# Patient Record
Sex: MALE | Race: WHITE | Employment: FULL TIME | URBAN - METROPOLITAN AREA
[De-identification: names, ages, dates, MRNs, and addresses within clinical notes are randomized per-mention and may not be internally consistent; named-entity substitution may affect disease eponyms.]

---

## 2017-01-24 ENCOUNTER — TELEPHONE (OUTPATIENT)
Dept: NEUROLOGY | Age: 57
End: 2017-01-24

## 2017-02-23 ENCOUNTER — OFFICE VISIT (OUTPATIENT)
Dept: NEUROLOGY | Age: 57
End: 2017-02-23

## 2017-02-23 DIAGNOSIS — F43.21 ADJUSTMENT REACTION, DEPRESSIVE, BRIEF: ICD-10-CM

## 2017-02-23 DIAGNOSIS — R41.3 MEMORY LOSS: Primary | ICD-10-CM

## 2017-02-23 DIAGNOSIS — Z63.0 MARITAL CONFLICT: ICD-10-CM

## 2017-02-23 SDOH — SOCIAL STABILITY - SOCIAL INSECURITY: PROBLEMS IN RELATIONSHIP WITH SPOUSE OR PARTNER: Z63.0

## 2017-03-07 ENCOUNTER — OFFICE VISIT (OUTPATIENT)
Dept: NEUROLOGY | Age: 57
End: 2017-03-07

## 2017-03-07 DIAGNOSIS — G31.84 MILD COGNITIVE IMPAIRMENT WITH MEMORY LOSS: Primary | ICD-10-CM

## 2017-03-07 DIAGNOSIS — Z63.0 MARITAL CONFLICT: ICD-10-CM

## 2017-03-07 DIAGNOSIS — F33.1 DEPRESSION, MAJOR, RECURRENT, MODERATE (HCC): ICD-10-CM

## 2017-03-07 DIAGNOSIS — F41.1 GENERALIZED ANXIETY DISORDER: ICD-10-CM

## 2017-03-07 SDOH — SOCIAL STABILITY - SOCIAL INSECURITY: PROBLEMS IN RELATIONSHIP WITH SPOUSE OR PARTNER: Z63.0

## 2017-03-08 NOTE — PROGRESS NOTES
[address] [facility],[address]  [address] [facility]   [facility] [address]    [phone] Office   [phone] Fax      Neuropsychological Evaluation Report      Referral:  Geeta Pearson MD, Dr. Geovanny Santos, [facility], Mariella Morillo, [facility]    Jean Benton is a 64 y.o. right handed   male who was unaccompanied to the initial clinical interview on 2/23/17 . Please refer to his medical records for details pertaining to his history. Briefly, the patient reported that he completed the 12th grade without history of previously diagnosed LD and/or receipt of special education services. 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 Ms. Latoya Veras recommended he come in for an evaluation as he has noticed some decline in his short term memory. Has to remind himself of things. Notices problems at work and at home, but mainly at work. Forgets the content of conversations sometimes. Misplaces. Forgets details about things. He has been diagnosed with epilepsy for which he sees Neurology. Vagus. Depends on others at work for different things. He is independent for driving, medications, finances, day-to-day chores, etc.  Spouse has noticed that he is no longer paying attention as much. Sleep is fine. Appetite is fine. Had VNS put in 8 years ago. No seizures. No other known neurologic history. Balance is okay. He started seeing Ms. Latoya Veras after he and his wife were having some marital problems. Started with Ms. Young about two and a half months now. She is wondering about possible mood concerns or underlying organic based neurocognitive concerns. He would like to have more control of the money situation and he and his spouse are having arguments about that. On his side of the family, no dementia of which he is aware. Does not know much about his real father's side of family.  His son also has a seizure disorder, no others. Son is age 34. He was first diagnosed with epilepsy as first seizure he had was about 25 years ago. No psychiatrist but was put on medication for periodic anger (Paxil). He was more aggravated with his spouse perhaps. Wondering if he needs to be on that or not. He has been on Paxil for the past 1 1/2 years, and has not noticed any difference. No previous neuropsych testing.       MMSE: 27/30    Clock: Normal    TOPF:  39    Historian: Good  Praxis: No UE apraxia  R/L Orientation: Intact to self and to other  Dress: within normal limits   Weight: within normal limits   Appearance/Hygiene: within normal limits   Gait: within normal limits   Assistive Devices: None  Mood: within normal limits   Affect: within normal limits   Comprehension: within normal limits   Thought Process: within normal limits   Expressive Language: within normal limits   Receptive Language: within normal limits   Motor:  No cognitive or motor perseveration  ETOH: Denied  Tobacco: Denied  Illicit: Denied  SI/HI: Denied  Psychosis: Denied  Insight: Within normal limits  Judgment: Within normal limits  Other Psych:      Past Medical History:   Diagnosis Date    Burning with urination     Hypertension     Seizures (HCC)     Sleep apnea     Pt has machine but does not use    Unspecified sleep apnea        Past Surgical History:   Procedure Laterality Date    HX OTHER SURGICAL  2005    vagus nerve stimulator       Allergies   Allergen Reactions    Tegretol [Carbamazepine] Rash       Family History   Problem Relation Age of Onset    Heart Disease Maternal Uncle     Cancer Father     Heart Disease Maternal Uncle        Social History   Substance Use Topics    Smoking status: Former Smoker     Packs/day: 1.50     Years: 20.00     Quit date: 1/27/2001    Smokeless tobacco: Former User    Alcohol use No       Current Outpatient Prescriptions   Medication Sig Dispense Refill    oxyCODONE-acetaminophen (PERCOCET) 5-325 mg per tablet Take 1-2 Tabs by mouth every four (4) hours as needed for Pain. Max Daily Amount: 12 Tabs. 30 Tab 0    cephALEXin (KEFLEX) 500 mg capsule Take 500 mg by mouth four (4) times daily. 1 q 6 hrs for seven days      ibuprofen (MOTRIN) 800 mg tablet Take 800 mg by mouth every eight (8) hours as needed for Pain.  metaxalone (SKELAXIN) 800 mg tablet Take 800 mg by mouth three (3) times daily. As needed      chlorthalidone (HYGROTEN) 25 mg tablet Take  by mouth daily.  tadalafil (CIALIS) 5 mg tablet Take 5 mg by mouth daily.  lacosamide (VIMPAT) 200 mg tab tablet take 1 and 1/2 tablets by mouth three times a day 405 Tab 5    levETIRAcetam (KEPPRA) 750 mg tablet Take 4 tabs tid 1080 Tab 5    PARoxetine (PAXIL) 40 mg tablet Take 40 mg by mouth daily.  rosuvastatin (CRESTOR) 40 mg tablet Take 40 mg by mouth daily. NON FORMULARY            Plan:  Obtain authorization for testing from insurance company. Report to follow once testing, scoring, and interpretation completed. ? Organic based neurocognitive issues versus mood disorder or combination of same. ? Problems organic, functional, or both? This note will not be viewable in [address] Neuropsychological Test Results  Patient Testing 3/7/17 Report Completed 3/8/17  A Psychometrist Assisted w/ portions of this evaluation while under my direct supervision    The following assessment procedures were performed:      Neuropsychologist Performed, Interpreted, & Reported: Neuropsychological Mental Status Exam, Revised Memory & Behavior Checklist, Mini Mental State Exam, Clock Drawing Test, Test Of Premorbid Functioning, Rubin-Melzack Pain Questionnaire,  History Taking  & Clinical Interview With The Patient,  Review Of Available Records.     Psychometrist Administered & Neuropsychologist Interpreted & Neuropsychologist Reported: Finger Tapping Test, Grooved Pegboard Test, Speech-Sounds Perception Test, JUDE, Wechsler Adult Intelligence Scale - IV, Verbal Fluency Tests, Wade & Wade - Revised, Trailmaking Test Parts A & B, [place] Verbal Learning Test - II, Naldo Complex Figure Test, Blake Depression Inventory - II, Blake Anxiety Inventory, Personality Assessment Inventory. Computer Administered & Neuropsychologist Interpreted & Neuropsychologist Reported: Yarelis Continuous Performance Test - III      Test Findings:  Test Findings:  Note:  The patients raw data have been compared with currently available norms which include demographic corrections for age, gender, and/or education. Sometimes, the patients scores are compared to demographically similar individuals as close to the patients age, education level, etc., as possible. \"Average\" is viewed as being +/- 1 standard deviation (SD) from the stated mean for a particular test score. \"Low average\" is viewed as being between 1 and 2 SD below the mean, and above average is viewed as being 1 and 2 SD above the mean. Scores falling in the borderline range (between 1-1/2 and 2 SD below the mean) are viewed with particular attention as to whether they are normal or abnormal neurocognitive test scores. Other methods of inference in analyzing the test data are also utilized, including the pattern and range of scores in the profile, bilateral motor functions, and the presence, if any, of pathognomonic signs. Behaviorally, the patient was friendly and cooperative and appeared motivated to perform well during this examination. Within this context, the results of this evaluation are viewed as a valid reflection of the patients actual neurocognitive and emotional status. His structured word list fluency, as assessed by the FAS Test, was within the moderately impaired range with a T score of 29. Category fluency was within the mildly to moderately impaired range with a T score of 34.   Confrontation naming ability, as assessed by the Wade & Wade - Revised, was within the mildly impaired range at 47/60 correct (T = 37). This pattern of performance is indicative of a patient who is at increased risk for day-to-day problems with verbal fluency and confrontation naming. The patient was administered the Yarelis Continuous Performance Test - III, a computer-administered test of sustained attention, and review of the subscales within this instrument revealed mild concerns for inattentiveness without impulsivity. Verbal auditory attention and discrimination, as assessed by the Speech-Sounds Perception Test (T = 38) was within the mildly impaired range. Nonverbal auditory attention and discrimination, as assessed by the JUDE, also revealed very mild concern for inattentiveness without impulsivity. This pattern of performance is indicative of a patient who is at increased risk for day-to-day problems with sustained visual attention/concentration, verbal auditory attention, and nonverbal auditory attention and discrimination related abilities. The patient was administered the Wechsler Adult Intelligence Scale - IV. There was a clinically significant difference between his low average range Working Memory Index score of 80 (9th %iile) and his average range Processing Speed Index score of 97. This pattern of performance is not indicative of a patient who is at increased risk for day-to-day problems with working memory or processing speed. His Verbal Comprehension Index score of 74 was borderline, as was his Perceptual Reasoning Index score of 77. See Appendix I (scanned into media section of this EMR) for full breakdown of IQ test scores. Perceptual reasoning and verbal comprehension are both lower than expected based on his performance on a measure assessing premorbid functioning levels. The patient was administered the New Early Verbal Learning Test  - II and generated a normal range (and positive) learning curve over five repeated auditory word list learning trials.   An interference trial was impaired. Free and cued short delayed recall were just within the normal range. Free and cued, long delayed recall were both impaired. Recognition recall was impaired. Forced choice recall was borderline. This pattern of performance is indicative of a patient who is at increased risk for day-to-day problems with auditory delayed memory. The patients performance on the copy portion of the Naldo Complex Figure Test was impaired. Recall for the complex design was within the impaired range after both short and long delays. Recognition recall was low average. This pattern of performance is  indicative of a patient who is at increased risk for day-to-day problems with visual organization and visual delayed memory. Simple timed visual motor sequencing (Trailmaking Test Part A) was within the average range with a T score of 54. His performance on a similar, but more complex task of timed visual motor sequencing (Trailmaking Test Part B) was within the mildly impaired range with a T score of 39. He made zero sequencing errors on this latter test.  Taken together, this pattern of performance is not indicative of a patient who is at increased risk for day-to-day problems with executive functioning. Motor speed for finger tapping was within the normal range bilaterally. Fine motor dexterity was below average for his dominant hand and mildly impaired for his nondominant hand. This is a mixed pattern of performance with respect to potentially lateralized issues for which neurologic correlation is indicated. The patient rated his current level of pain as \"1/5 - Mild\" on the Rubin-Melzack Pain Questionnaire. He reported pain in his back and left shoulder region. His Blake Depression Inventory -II score of 21 was within the moderately depressed range. His Blake Anxiety Inventory score of 19 reflected moderate anxiety.   The patient was administered the Personality Assessment Inventory and a high level of defensiveness was present, as was some exaggeration of some symptoms. This is somewhat unusual.  The personality profile suggests a person who has depression and a strong need to be accepted by others. His level of treatment motivation is somewhat low, and I do not see evidence of additional Axis I or any Axis II psychopathology. Impressions & Recommendations: This patient generated an abnormal range Neuropsychological Evaluation with respect to neurocognitive functioning. In this regard, significant impairments are noted for auditory memory and visual memory. Moderate impairments are noted for verbal fluency and confrontation naming. Mild impairments are noted for visual and auditory attention related abilities. His IQ and executive functioning abilities remain normal.  Emotionally, he reported moderate depression and anxiety. There is evidence of a decline from estimated premorbid functioning levels. While young for this type of memory loss, I am concerned both about his currently impaired range memory scores as well as the report/concern that memory issues are becoming more progressive. Should a reversible cause for this memory loss not be ruled out, then this is atypical onset of dementia. Neither the attention problem (mild), the mood problems (moderate) or the combination of those issues would explain completely this sort of memory loss, especially given that his auditory recognition recall is normal.  At this point, consider medication for memory, if only on a prophylactic basis. Furthermore, I recommend psychiatric treatment and continued counseling for depression and anxiety. The attention problem is very mild, and may be worth treatment at some point, but right now I am concerned more about memory and mood. I find him competent, but he needs supervision for medications and finances.   I am not overly concerned about driving but he should use a GPS device if travelling to less frequented places. I would like to follow up with him in six months to test his memory again to track and see whether or not memory is declining to give a more definitive diagnosis and to allow for his ongoing neurologic work ups to be completed. Baseline now established. Follow up prn. Clinical correlation is, of course, indicated. I will discuss these findings with the patient when he follows up with me in the near future. A follow up Neuropsychological Evaluation is indicated on a prn basis, especially if there are any cognitive and/or emotional changes. DIAGNOSES:  MCI W/ Memory Loss     Major Depression - Moderate     Anxiety Disorder - Moderate     Relationship problem/stress     The above information is based upon information currently available to me. If there is any additional information of which I am currently unaware, I would be more than happy to review it upon having it made available to me. Thank you for the opportunity to see this interesting individual.     Sincerely,       Pam Gandara. Bita Stout, EdS        dd  CC:  Loreta Abreu MD, Dr. Esau Collins, MsDilcia Hilario, Ms. Bj Silver        2 units -[id number]  Record review. Review of history provided by patient. Review of collaborative information. Testing by Clinician. Review of raw data. Scoring. Report writing of individual tests administered by Clinician. Integration of individual tests administered by psychometrist (that were previously reported and billed under psychometry code below) with testing by clinician and review of records/history/collaborative information. Case Conceptualization, Report writing. Coordination Of Care. 5 units  -[id number] - Psychometrist test prep, administration, and scoring under clinician's direct supervision.   Clinical interpretation of individual tests administered by psychometrist .  Clinician report of individual tests administered by psychometrist.    1 unit - 14128 - Computer testing and scoring and clinician's interpretation of computer-administered test(s)    \"Unit\" is defined by CPT/National Guidelines (31 - 60 minutes). Integral services including scoring of raw data, data interpretation, case conceptualization, report writing etcetera were initiated after the patient finished testing/raw data collected and was completed on the date the report was signed.

## 2017-03-09 DIAGNOSIS — G40.219 PARTIAL EPILEPSY WITH IMPAIRMENT OF CONSCIOUSNESS, INTRACTABLE (HCC): ICD-10-CM

## 2017-03-09 RX ORDER — LEVETIRACETAM 750 MG/1
TABLET ORAL
Qty: 1080 TAB | Refills: 1 | Status: SHIPPED | OUTPATIENT
Start: 2017-03-09 | End: 2017-08-10 | Stop reason: SDUPTHER

## 2017-03-22 DIAGNOSIS — G40.219 PARTIAL EPILEPSY WITH IMPAIRMENT OF CONSCIOUSNESS, INTRACTABLE (HCC): ICD-10-CM

## 2017-03-22 RX ORDER — LACOSAMIDE 200 MG/1
TABLET ORAL
Qty: 405 TAB | Refills: 5 | Status: SHIPPED | OUTPATIENT
Start: 2017-03-22 | End: 2017-11-10 | Stop reason: SDUPTHER

## 2017-04-20 ENCOUNTER — OFFICE VISIT (OUTPATIENT)
Dept: NEUROLOGY | Age: 57
End: 2017-04-20

## 2017-04-20 VITALS
BODY MASS INDEX: 30.88 KG/M2 | WEIGHT: 233 LBS | SYSTOLIC BLOOD PRESSURE: 140 MMHG | DIASTOLIC BLOOD PRESSURE: 70 MMHG | RESPIRATION RATE: 20 BRPM | HEIGHT: 73 IN

## 2017-04-20 DIAGNOSIS — G31.84 MCI (MILD COGNITIVE IMPAIRMENT): Primary | ICD-10-CM

## 2017-04-20 RX ORDER — DONEPEZIL HYDROCHLORIDE 10 MG/1
10 TABLET, FILM COATED ORAL
Qty: 30 TAB | Refills: 5 | Status: SHIPPED | OUTPATIENT
Start: 2017-04-20 | End: 2017-11-10 | Stop reason: SDUPTHER

## 2017-04-20 RX ORDER — DONEPEZIL HYDROCHLORIDE 5 MG/1
TABLET, FILM COATED ORAL
Qty: 30 TAB | Refills: 0 | Status: SHIPPED | OUTPATIENT
Start: 2017-04-20 | End: 2017-08-22

## 2017-04-20 NOTE — PATIENT INSTRUCTIONS
[facility]   Statement to Patients  April 1, 2014      In an effort to ensure the large volume of patient prescription refills is processed in the most efficient and expeditious manner, we are asking our patients to assist us by calling your Pharmacy for all prescription refills, this will include also your  Mail Order Pharmacy. The pharmacy will contact our office electronically to continue the refill process. Please do not wait until the last minute to call your pharmacy. We need at least 48 hours (2days) to fill prescriptions. We also encourage you to call your pharmacy before going to  your prescription to make sure it is ready. With regard to controlled substance prescription refill requests (narcotic refills) that need to be picked up at our office, we ask your cooperation by providing us with at least 72 hours (3days) notice that you will need a refill. We will not refill narcotic prescription refill requests after 4:00pm on any weekday, Monday through Thursday, or after 2:00pm on Fridays, or on the weekends. We encourage everyone to explore another way of getting your prescription refill request processed using [employer], our patient web portal through our electronic medical record system. [employer] is an efficient and effective way to communicate your medication request directly to the office and  downloadable as an natacha on your smart phone . [employer] also features a review functionality that allows you to view your medication list as well as leave messages for your physician. Are you ready to get connected? If so please review the attatched instructions or speak to any of our staff to get you set up right away! Thank you so much for your cooperation. Should you have any questions please contact our Practice Administrator. The Physicians and Staff,  [facility] [address] [place] [address]  What is a living will?   A living will is a legal form you use to write down the kind of care you want at the end of your life. It is used by the health professionals who will treat you if you aren't able to decide for yourself. If you put your wishes in writing, your loved ones and others will know what kind of care you want. They won't need to guess. This can ease your mind and be helpful to others. A living will is not the same as an estate or property will. An estate will explains what you want to happen with your money and property after you die. Is a living will a legal document? A living will is a legal document. Each state has its own laws about living fermin. If you move to another state, make sure that your living will is legal in the state where you now live. Or you might use a universal form that has been approved by many states. This kind of form can sometimes be completed and stored online. Your electronic copy will then be available wherever you have a connection to the Internet. In most cases, doctors will respect your wishes even if you have a form from a different state. · You don't need an  to complete a living will. But legal advice can be helpful if your state's laws are unclear, your health history is complicated, or your family can't agree on what should be in your living will. · You can change your living will at any time. Some people find that their wishes about end-of-life care change as their health changes. · In addition to making a living will, think about completing a medical power of  form. This form lets you name the person you want to make end-of-life treatment decisions for you (your \"health care agent\") if you're not able to. Many hospitals and nursing homes will give you the forms you need to complete a living will and a medical power of . · Your living will is used only if you can't make or communicate decisions for yourself anymore.  If you become able to make decisions again, you can accept or refuse any treatment, no matter what you wrote in your living will. · Your state may offer an online registry. This is a place where you can store your living will online so the doctors and nurses who need to treat you can find it right away. What should you think about when creating a living will? Talk about your end-of-life wishes with your family members and your doctor. Let them know what you want. That way the people making decisions for you won't be surprised by your choices. Think about these questions as you make your living will:  · Do you know enough about life support methods that might be used? If not, talk to your doctor so you know what might be done if you can't breathe on your own, your heart stops, or you're unable to swallow. · What things would you still want to be able to do after you receive life-support methods? Would you want to be able to walk? To speak? To eat on your own? To live without the help of machines? · If you have a choice, where do you want to be cared for? In your home? At a hospital or nursing home? · Do you want certain Shinto practices performed if you become very ill? · If you have a choice at the end of your life, where would you prefer to die? At home? In a hospital or nursing home? Somewhere else? · Would you prefer to be buried or cremated? · Do you want your organs to be donated after you die? What should you do with your living will? · Make sure that your family members and your health care agent have copies of your living will. · Give your doctor a copy of your living will to keep in your medical record. If you have more than one doctor, make sure that each one has a copy. · You may want to put a copy of your living will where it can be easily found. Where can you learn more? Go to http://blossom-emma.info/. Enter L013 in the search box to learn more about \"Learning About Living Chantal. \"  Current as of: February 24, 2016  Content Version: 11.2  © 7590-3834 [employer]. Care instructions adapted under license by [employer] (which disclaims liability or warranty for this information). If you have questions about a medical condition or this instruction, always ask your healthcare professional. [facility] [address] any warranty or liability for your use of this information. Advance Directives: Care Instructions  Your Care Instructions  An advance directive is a legal way to state your wishes at the end of your life. It tells your family and your doctor what to do if you can no longer say what you want. There are two main types of advance directives. You can change them any time that your wishes change. · A living will tells your family and your doctor your wishes about life support and other treatment. · A durable power of  for health care lets you name a person to make treatment decisions for you when you can't speak for yourself. This person is called a health care agent. If you do not have an advance directive, decisions about your medical care may be made by a doctor or a  who doesn't know you. It may help to think of an advance directive as a gift to the people who care for you. If you have one, they won't have to make tough decisions by themselves. Follow-up care is a key part of your treatment and safety. Be sure to make and go to all appointments, and call your doctor if you are having problems. It's also a good idea to know your test results and keep a list of the medicines you take. How can you care for yourself at home? · Discuss your wishes with your loved ones and your doctor. This way, there are no surprises. · Many states have a unique form. Or you might use a universal form that has been approved by many states. This kind of form can sometimes be completed and stored online.  Your electronic copy will then be available wherever you have a connection to the Internet. In most cases, doctors will respect your wishes even if you have a form from a different state. · You don't need a  to do an advance directive. But you may want to get legal advice. · Think about these questions when you prepare an advance directive:  ¨ Who do you want to make decisions about your medical care if you are not able to? Many people choose a family member or close friend. ¨ Do you know enough about life support methods that might be used? If not, talk to your doctor so you understand. ¨ What are you most afraid of that might happen? You might be afraid of having pain, losing your independence, or being kept alive by machines. ¨ Where would you prefer to die? Choices include your home, a hospital, or a nursing home. ¨ Would you like to have information about hospice care to support you and your family? ¨ Do you want to donate organs when you die? ¨ Do you want certain Sikh practices performed before you die? If so, put your wishes in the advance directive. · Read your advance directive every year, and make changes as needed. When should you call for help? Be sure to contact your doctor if you have any questions. Where can you learn more? Go to http://blossom-emma.info/. Enter R264 in the search box to learn more about \"Advance Directives: Care Instructions. \"  Current as of: November 17, 2016  Content Version: 11.2  © 8822-2869 [employer]. Care instructions adapted under license by [employer] (which disclaims liability or warranty for this information). If you have questions about a medical condition or this instruction, always ask your healthcare professional. [address] any warranty or liability for your use of this information.

## 2017-04-20 NOTE — PROGRESS NOTES
Amanda Chaves is a 64 y.o. male who presents with the following  Chief Complaint   Patient presents with    Memory Loss       HPI Patient of Dr. Katty Allen coming in for a follow up for memory testing. States his memory is about the same. Feels like things have been stable just having trouble with day to day remembering things he should remember. 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 Going to counseling with wife and feels like this is helping him. Seizures stable. Allergies   Allergen Reactions    Tegretol [Carbamazepine] Rash       Current Outpatient Prescriptions   Medication Sig    donepezil (ARICEPT) 5 mg tablet Take 1 tablet by mouth nightly X 1 month. Then increase to 10 mg thereafter.  donepezil (ARICEPT) 10 mg tablet Take 1 Tab by mouth nightly.  lacosamide (VIMPAT) 200 mg tab tablet take 1 and 1/2 tablets by mouth three times a day    levETIRAcetam (KEPPRA) 750 mg tablet Take 4 tabs tid    ibuprofen (MOTRIN) 800 mg tablet Take 800 mg by mouth every eight (8) hours as needed for Pain.  tadalafil (CIALIS) 5 mg tablet Take 5 mg by mouth daily.  chlorthalidone (HYGROTEN) 25 mg tablet Take  by mouth daily.  PARoxetine (PAXIL) 40 mg tablet Take 40 mg by mouth daily. No current facility-administered medications for this visit.         History   Smoking Status    Former Smoker    Packs/day: 1.50    Years: 20.00    Quit date: 1/27/2001   Smokeless Tobacco    Former User       Past Medical History:   Diagnosis Date    Burning with urination     Hypertension     Seizures ([address]     Sleep apnea     Pt has machine but does not use    Unspecified sleep apnea        Past Surgical History:   Procedure Laterality Date    HX OTHER SURGICAL  2005    vagus nerve stimulator       Family History   Problem Relation Age of Onset    Heart Disease Maternal Uncle     Cancer Father     Heart Disease Maternal Uncle        Social History     Social History    Marital status:      Spouse name: N/A    Number of children: N/A    Years of education: N/A     Social History Main Topics    Smoking status: Former Smoker     Packs/day: 1.50     Years: 20.00     Quit date: 1/27/2001    Smokeless tobacco: Former User    Alcohol use No    Drug use: No    Sexual activity: Not Asked     Other Topics Concern    None     Social History Narrative       Review of Systems   HENT: Negative for hearing loss and tinnitus. Eyes: Negative for blurred vision, double vision and photophobia. Gastrointestinal: Negative for nausea and vomiting. Neurological: Negative for weakness and headaches. Psychiatric/Behavioral: Positive for depression and memory loss. Negative for hallucinations and suicidal ideas. The patient is nervous/anxious. The patient does not have insomnia. Remainder of comprehensive review is negative. Physical Exam :    Visit Vitals    /70    Resp 20    Ht 6' 1\" (1.854 m)    Wt 105.7 kg (233 lb)    BMI 30.74 kg/m2               Results for orders placed or performed during the hospital encounter of 07/01/16   POC CHEM8   Result Value Ref Range    Calcium, ionized (POC) 1.12 1.12 - 1.32 MMOL/L    Sodium (POC) 143 136 - 145 MMOL/L    Potassium (POC) 3.7 3.5 - 5.1 MMOL/L    Chloride (POC) 99 98 - 107 MMOL/L    CO2 (POC) 29 21 - 32 MMOL/L    Anion gap (POC) 20 (H) 5 - 15 mmol/L    Glucose (POC) 99 75 - 110 MG/DL    BUN (POC) 14 9 - 20 MG/DL    Creatinine (POC) 0.9 0.6 - 1.3 MG/DL    GFR-AA (POC) >60 >60 ml/min/1.73m2    GFR, non-AA (POC) >60 >60 ml/min/1.73m2    Hemoglobin (POC) 14.6 12.1 - 17.0 GM/DL    Hematocrit (POC) 43 36.6 - 50.3 %    Comment Comment Not Indicated. Orders Placed This Encounter    donepezil (ARICEPT) 5 mg tablet     Sig: Take 1 tablet by mouth nightly X 1 month. Then increase to 10 mg thereafter. Dispense:  30 Tab     Refill:  0    donepezil (ARICEPT) 10 mg tablet     Sig: Take 1 Tab by mouth nightly.      Dispense:  30 Tab     Refill:  5       1. MCI (mild cognitive impairment)        Follow-up Disposition:  Return in about 2 months (around 6/20/2017). We discussed his neuropsychiatric testing in full. He has no questions about the results. He will continue therapy for his psychiatric issues along with his marriage counseling. He will try Aricept up to 10 mg nightly to help with memory loss and prevention of further loss. We discussed MCI and dementia and progression of both. We discussed re testing in 6 months and he understands and agrees. Stay mentally and physically active. Call with any issues.        Renata Horvath NP        This note will not be viewable in [address]

## 2017-04-20 NOTE — MR AVS SNAPSHOT
Visit Information Date & Time Provider Department Dept. Phone Encounter #  
 4/20/2017  2:30 PM Jigar Crowder NP [facility] [phone] [id number] Follow-up Instructions Return in about 2 months (around 6/20/2017). Your Appointments 6/27/2017  8:20 AM  
Follow Up with Governor Gladis MD  
[facility] Appt Note: seizure kathy $0CP  
 [address] [address]  
[place] [address] [address] Upcoming Health Maintenance Date Due Hepatitis C Screening 1960 DTaP/Tdap/Td series (1 - Tdap) 9/20/1981 FOBT Q 1 YEAR AGE 50-75 9/20/2010 INFLUENZA AGE 9 TO ADULT 8/1/2016 Allergies as of 4/20/2017  Review Complete On: 3/8/2017 By: Marjorie Adorno PsyD Severity Noted Reaction Type Reactions Tegretol [Carbamazepine]  01/12/2011    Rash Current Immunizations  Never Reviewed No immunizations on file. Not reviewed this visit You Were Diagnosed With   
  
 Codes Comments MCI (mild cognitive impairment)    -  Primary ICD-10-CM: G31.84 ICD-9-CM: 331.83 Vitals BP Resp Height(growth percentile) Weight(growth percentile) BMI Smoking Status 140/70 20 6' 1\" (1.854 m) 233 lb (105.7 kg) 30.74 kg/m2 Former Smoker BMI and BSA Data Body Mass Index Body Surface Area 30.74 kg/m 2 2.33 m 2 Preferred Pharmacy Pharmacy Name Phone [facility] [address] Friday [phone] Your Updated Medication List  
  
   
This list is accurate as of: 4/20/17  3:05 PM.  Always use your most recent med list.  
  
  
  
  
 chlorthalidone 25 mg tablet Commonly known as:  Micaela Kallman Take  by mouth daily. * donepezil 5 mg tablet Commonly known as:  ARICEPT Take 1 tablet by mouth nightly X 1 month.  Then increase to 10 mg thereafter. * donepezil 10 mg tablet Commonly known as:  ARICEPT Take 1 Tab by mouth nightly. ibuprofen 800 mg tablet Commonly known as:  MOTRIN Take 800 mg by mouth every eight (8) hours as needed for Pain. lacosamide 200 mg Tab tablet Commonly known as:  VIMPAT  
take 1 and 1/2 tablets by mouth three times a day  
  
 levETIRAcetam 750 mg tablet Commonly known as:  KEPPRA Take 4 tabs tid PAXIL 40 mg tablet Generic drug:  PARoxetine Take 40 mg by mouth daily. tadalafil 5 mg tablet Commonly known as:  CIALIS Take 5 mg by mouth daily. * Notice: This list has 2 medication(s) that are the same as other medications prescribed for you. Read the directions carefully, and ask your doctor or other care provider to review them with you. Prescriptions Sent to Pharmacy Refills  
 donepezil (ARICEPT) 5 mg tablet 0 Sig: Take 1 tablet by mouth nightly X 1 month. Then increase to 10 mg thereafter. Class: Normal  
 Pharmacy: [place] [address] Ph #: [phone]  
 donepezil (ARICEPT) 10 mg tablet 5 Sig: Take 1 Tab by mouth nightly. Class: Normal  
 Pharmacy: [address] BR[place]ANY DODGE [facility] Ph #: [phone] Route: Oral  
  
Follow-up Instructions Return in about 2 months (around 6/20/2017). Patient Instructions PRESCRIPTION REFILL POLICY [facility] Statement to Patients April 1, 2014 In an effort to ensure the large volume of patient prescription refills is processed in the most efficient and expeditious manner, we are asking our patients to assist us by calling your Pharmacy for all prescription refills, this will include also your  Mail Order Pharmacy. The pharmacy will contact our office electronically to continue the refill process. Please do not wait until the last minute to call your pharmacy. We need at least 48 hours (2days) to fill prescriptions. We also encourage you to call your pharmacy before going to  your prescription to make sure it is ready. With regard to controlled substance prescription refill requests (narcotic refills) that need to be picked up at our office, we ask your cooperation by providing us with at least 72 hours (3days) notice that you will need a refill. We will not refill narcotic prescription refill requests after 4:00pm on any weekday, Monday through Thursday, or after 2:00pm on Fridays, or on the weekends. We encourage everyone to explore another way of getting your prescription refill request processed using Enigmatec, our patient web portal through our electronic medical record system. Enigmatec is an efficient and effective way to communicate your medication request directly to the office and  downloadable as an natacha on your smart phone . Enigmatec also features a review functionality that allows you to view your medication list as well as leave messages for your physician. Are you ready to get connected? If so please review the attatched instructions or speak to any of our staff to get you set up right away! Thank you so much for your cooperation. Should you have any questions please contact our Practice Administrator. The Physicians and Staff,  [facility] [place] [address] What is a living will? A living will is a legal form you use to write down the kind of care you want at the end of your life. It is used by the health professionals who will treat you if you aren't able to decide for yourself. If you put your wishes in writing, your loved ones and others will know what kind of care you want. They won't need to guess. This can ease your mind and be helpful to others. A living will is not the same as an estate or property will.  An estate will explains what you want to happen with your money and property after you die. Is a living will a legal document? A living will is a legal document. Each state has its own laws about living fermin. If you move to another state, make sure that your living will is legal in the state where you now live. Or you might use a universal form that has been approved by many states. This kind of form can sometimes be completed and stored online. Your electronic copy will then be available wherever you have a connection to the Internet. In most cases, doctors will respect your wishes even if you have a form from a different state. · You don't need an  to complete a living will. But legal advice can be helpful if your state's laws are unclear, your health history is complicated, or your family can't agree on what should be in your living will. · You can change your living will at any time. Some people find that their wishes about end-of-life care change as their health changes. · In addition to making a living will, think about completing a medical power of  form. This form lets you name the person you want to make end-of-life treatment decisions for you (your \"health care agent\") if you're not able to. Many hospitals and nursing homes will give you the forms you need to complete a living will and a medical power of . · Your living will is used only if you can't make or communicate decisions for yourself anymore. If you become able to make decisions again, you can accept or refuse any treatment, no matter what you wrote in your living will. · Your state may offer an online registry. This is a place where you can store your living will online so the doctors and nurses who need to treat you can find it right away. What should you think about when creating a living will?  
Talk about your end-of-life wishes with your family members and your doctor. Let them know what you want. That way the people making decisions for you won't be surprised by your choices. Think about these questions as you make your living will: · Do you know enough about life support methods that might be used? If not, talk to your doctor so you know what might be done if you can't breathe on your own, your heart stops, or you're unable to swallow. · What things would you still want to be able to do after you receive life-support methods? Would you want to be able to walk? To speak? To eat on your own? To live without the help of machines? · If you have a choice, where do you want to be cared for? In your home? At a hospital or nursing home? · Do you want certain Shinto practices performed if you become very ill? · If you have a choice at the end of your life, where would you prefer to die? At home? In a hospital or nursing home? Somewhere else? · Would you prefer to be buried or cremated? · Do you want your organs to be donated after you die? What should you do with your living will? · Make sure that your family members and your health care agent have copies of your living will. · Give your doctor a copy of your living will to keep in your medical record. If you have more than one doctor, make sure that each one has a copy. · You may want to put a copy of your living will where it can be easily found. Where can you learn more? Go to http://blossom-emma.info/. Enter D592 in the search box to learn more about \"Learning About Living Chantal. \" Current as of: February 24, 2016 Content Version: 11.2 © 9096-4008 Dnevnik. Care instructions adapted under license by AzureBooker (which disclaims liability or warranty for this information).  If you have questions about a medical condition or this instruction, always ask your healthcare professional. [address]  any warranty or liability for your use of this information. Advance Directives: Care Instructions Your Care Instructions An advance directive is a legal way to state your wishes at the end of your life. It tells your family and your doctor what to do if you can no longer say what you want. There are two main types of advance directives. You can change them any time that your wishes change. · A living will tells your family and your doctor your wishes about life support and other treatment. · A durable power of  for health care lets you name a person to make treatment decisions for you when you can't speak for yourself. This person is called a health care agent. If you do not have an advance directive, decisions about your medical care may be made by a doctor or a  who doesn't know you. It may help to think of an advance directive as a gift to the people who care for you. If you have one, they won't have to make tough decisions by themselves. Follow-up care is a key part of your treatment and safety. Be sure to make and go to all appointments, and call your doctor if you are having problems. It's also a good idea to know your test results and keep a list of the medicines you take. How can you care for yourself at home? · Discuss your wishes with your loved ones and your doctor. This way, there are no surprises. · Many states have a unique form. Or you might use a universal form that has been approved by many states. This kind of form can sometimes be completed and stored online. Your electronic copy will then be available wherever you have a connection to the Internet. In most cases, doctors will respect your wishes even if you have a form from a different state. · You don't need a  to do an advance directive. But you may want to get legal advice. · Think about these questions when you prepare an advance directive: ¨ Who do you want to make decisions about your medical care if you are not able to? Many people choose a family member or close friend. ¨ Do you know enough about life support methods that might be used? If not, talk to your doctor so you understand. ¨ What are you most afraid of that might happen? You might be afraid of having pain, losing your independence, or being kept alive by machines. ¨ Where would you prefer to die? Choices include your home, a hospital, or a nursing home. ¨ Would you like to have information about hospice care to support you and your family? ¨ Do you want to donate organs when you die? ¨ Do you want certain Bahai practices performed before you die? If so, put your wishes in the advance directive. · Read your advance directive every year, and make changes as needed. When should you call for help? Be sure to contact your doctor if you have any questions. Where can you learn more? Go to http://blossom-emma.info/. Enter R264 in the search box to learn more about \"Advance Directives: Care Instructions. \" Current as of: November 17, 2016 Content Version: 11.2 © 7357-2335 ZPower. Care instructions adapted under license by iValidate.me (which disclaims liability or warranty for this information). If you have questions about a medical condition or this instruction, always ask your healthcare professional. [address] any warranty or liability for your use of this information. Introducing [facility] SERVICES! Dear Suni Ojeda: Thank you for requesting a Internet college internation S.L. account. Our records indicate that you already have an active Internet college internation S.L. account. You can access your account anytime at [email or web address] Did you know that you can access your hospital and ER discharge instructions at any time in Internet college internation S.L.? You can also review all of your test results from your hospital stay or ER visit. Additional Information If you have questions, please visit the Frequently Asked Questions section of the Kwikpikt website at [email or web address] Remember, OptaHEALTH is NOT to be used for urgent needs. For medical emergencies, dial 911. Now available from your iPhone and Android! Please provide this summary of care documentation to your next provider. Your primary care clinician is listed as [facility]. If you have any questions after today's visit, please call [phone].

## 2017-08-10 ENCOUNTER — TELEPHONE (OUTPATIENT)
Dept: NEUROLOGY | Age: 57
End: 2017-08-10

## 2017-08-10 DIAGNOSIS — G40.219 PARTIAL EPILEPSY WITH IMPAIRMENT OF CONSCIOUSNESS, INTRACTABLE (HCC): ICD-10-CM

## 2017-08-10 RX ORDER — LEVETIRACETAM 750 MG/1
TABLET ORAL
Qty: 1080 TAB | Refills: 1 | Status: SHIPPED | OUTPATIENT
Start: 2017-08-10 | End: 2018-01-29 | Stop reason: SDUPTHER

## 2017-08-10 NOTE — TELEPHONE ENCOUNTER
----- Message from Antonio Baez sent at 8/10/2017  9:42 AM EDT -----  Regarding: /[place]  Pt requesting a \" Vimpat\" Rx sent to mail order on file. Please give a call. Best contact [phone].

## 2017-08-22 ENCOUNTER — OFFICE VISIT (OUTPATIENT)
Dept: NEUROLOGY | Age: 57
End: 2017-08-22

## 2017-08-22 VITALS
SYSTOLIC BLOOD PRESSURE: 120 MMHG | HEART RATE: 80 BPM | OXYGEN SATURATION: 99 % | WEIGHT: 190 LBS | DIASTOLIC BLOOD PRESSURE: 88 MMHG | BODY MASS INDEX: 25.18 KG/M2 | TEMPERATURE: 98.2 F | RESPIRATION RATE: 18 BRPM | HEIGHT: 73 IN

## 2017-08-22 DIAGNOSIS — G31.84 MCI (MILD COGNITIVE IMPAIRMENT): ICD-10-CM

## 2017-08-22 DIAGNOSIS — G40.219 PARTIAL EPILEPSY WITH IMPAIRMENT OF CONSCIOUSNESS, INTRACTABLE (HCC): Primary | ICD-10-CM

## 2017-08-22 NOTE — PROGRESS NOTES
Follow up for seizure activity. Reports no seizure activity since last visit. No acute problems reported.

## 2017-08-22 NOTE — MR AVS SNAPSHOT
Visit Information Date & Time Provider Department Dept. Phone Encounter #  
 8/22/2017 11:40 AM Vannessa Patiño MD [address] [place] [address] Neurology Clinic [phone] [id number] Follow-up Instructions Return in about 6 months (around 2/22/2018). Upcoming Health Maintenance Date Due Hepatitis C Screening 1960 DTaP/Tdap/Td series (1 - Tdap) 9/20/1981 FOBT Q 1 YEAR AGE 50-75 9/20/2010 INFLUENZA AGE 9 TO ADULT 8/1/2017 Allergies as of 8/22/2017  Review Complete On: 8/22/2017 By: Jeff Kent LPN Severity Noted Reaction Type Reactions Tegretol [Carbamazepine]  01/12/2011    Rash Current Immunizations  Never Reviewed No immunizations on file. Not reviewed this visit Vitals BP Pulse Temp Resp Height(growth percentile) Weight(growth percentile) 120/88 80 98.2 °F (36.8 °C) 18 6' 1\" (1.854 m) 190 lb (86.2 kg) SpO2 BMI Smoking Status 99% 25.07 kg/m2 Former Smoker Vitals History BMI and BSA Data Body Mass Index Body Surface Area 25.07 kg/m 2 2.11 m 2 Preferred Pharmacy Pharmacy Name Phone [facility], [address] [place] [address] [address] [place] [address] Your Updated Medication List  
  
   
This list is accurate as of: 8/22/17 11:56 AM.  Always use your most recent med list.  
  
  
  
  
 chlorthalidone 25 mg tablet Commonly known as:  Catherine Sous Take  by mouth daily. donepezil 10 mg tablet Commonly known as:  ARICEPT Take 1 Tab by mouth nightly. ibuprofen 800 mg tablet Commonly known as:  MOTRIN Take 800 mg by mouth every eight (8) hours as needed for Pain. lacosamide 200 mg Tab tablet Commonly known as:  VIMPAT  
take 1 and 1/2 tablets by mouth three times a day  
  
 levETIRAcetam 750 mg tablet Commonly known as:  KEPPRA Take 4 tabs tid PAXIL 40 mg tablet Generic drug:  PARoxetine Take 40 mg by mouth daily. tadalafil 5 mg tablet Commonly known as:  CIALIS Take 5 mg by mouth daily. Follow-up Instructions Return in about 6 months (around 2/22/2018). Patient Instructions Information Regarding Testing If you have physican order for a test or a medication denied by your insurance company, this does not mean the test or medication is not appropriate for you as that is a medical decision, not a decision to be made by an insurance company representative or by an [facility] physician who has not interviewed and examined you. This is a decision to be made between you and your physician. The denial of services is a contractual matter between you and your insurance company, not an issue between your physician and the insurance company. If your test or medication is denied, you can take the following steps to help resolve the issue: 1. File a complaint with the [facility] of Insurance regarding your insurance company's denial of services ordered for you. You can do this either by calling them directly or by completing an on-line complaint form on the Magnitude Software. This can be found at [email or web address] 2. Also file a formal complaint with your insurance company and ask to have the name of the person denying the service so that you may explore a legal option should you be harmed by this denial of service. Again, the fact the insurance company will not pay for the service does not mean it is not medically necessary and I would encourage you to follow through with the plan that was made with your physician 3. File a written complaint with your employer so your employer and benefit manager is aware of the poor coverage they are providing their employees. If you have medicare/medicaid, complain to your representative in the House and to your Judith Dick. PRESCRIPTION REFILL POLICY [facility] Statement to Patients April 1, 2014 In an effort to ensure the large volume of patient prescription refills is processed in the most efficient and expeditious manner, we are asking our patients to assist us by calling your Pharmacy for all prescription refills, this will include also your  Mail Order Pharmacy. The pharmacy will contact our office electronically to continue the refill process. Please do not wait until the last minute to call your pharmacy. We need at least 48 hours (2days) to fill prescriptions. We also encourage you to call your pharmacy before going to  your prescription to make sure it is ready. With regard to controlled substance prescription refill requests (narcotic refills) that need to be picked up at our office, we ask your cooperation by providing us with at least 72 hours (3days) notice that you will need a refill. We will not refill narcotic prescription refill requests after 4:00pm on any weekday, Monday through Thursday, or after 2:00pm on Fridays, or on the weekends. We encourage everyone to explore another way of getting your prescription refill request processed using Stop Being Watched, our patient web portal through our electronic medical record system. Stop Being Watched is an efficient and effective way to communicate your medication request directly to the office and  downloadable as an natacha on your smart phone . Stop Being Watched also features a review functionality that allows you to view your medication list as well as leave messages for your physician. Are you ready to get connected? If so please review the attatched instructions or speak to any of our staff to get you set up right away! Thank you so much for your cooperation. Should you have any questions please contact our Practice Administrator. The Physicians and Staff,  [facility] If we have ordered testing for you, we do not call patients with results and we do not give test results over the phone. We schedule follow up appointments so that your results can be discussed in person and any questions you have regarding them may be addressed. If something of concern is revealed on your test, we will call you for a sooner follow up appointment. Additionally, results may be found by using the My Chart feature and one of our patient service representatives at the  can give you instructions on how to access this feature of our electronic medical record system. Introducing [facility] SERVICES! Dear Jeyson Cary: Thank you for requesting a Newstag account. Our records indicate that you already have an active Newstag account. You can access your account anytime at [email or web address] Did you know that you can access your hospital and ER discharge instructions at any time in Newstag? You can also review all of your test results from your hospital stay or ER visit. Additional Information If you have questions, please visit the Frequently Asked Questions section of the Newstag website at [email or web address] Remember, Newstag is NOT to be used for urgent needs. For medical emergencies, dial 911. Now available from your iPhone and Android! Please provide this summary of care documentation to your next provider. Your primary care clinician is listed as Rey Patterson. If you have any questions after today's visit, please call [phone].

## 2017-08-22 NOTE — PROGRESS NOTES
[address] [facility]. [address]   [place] [address]   [place], [address]    [facility]   [phone] Fax               Chief Complaint   Patient presents with    Seizure     follow up     Current Outpatient Prescriptions   Medication Sig Dispense Refill    levETIRAcetam (KEPPRA) 750 mg tablet Take 4 tabs tid 1080 Tab 1    donepezil (ARICEPT) 10 mg tablet Take 1 Tab by mouth nightly. 30 Tab 5    lacosamide (VIMPAT) 200 mg tab tablet take 1 and 1/2 tablets by mouth three times a day 405 Tab 5    ibuprofen (MOTRIN) 800 mg tablet Take 800 mg by mouth every eight (8) hours as needed for Pain.  chlorthalidone (HYGROTEN) 25 mg tablet Take  by mouth daily.  tadalafil (CIALIS) 5 mg tablet Take 5 mg by mouth daily.  PARoxetine (PAXIL) 40 mg tablet Take 40 mg by mouth daily. Allergies   Allergen Reactions    Tegretol [Carbamazepine] Rash     Social History   Substance Use Topics    Smoking status: Former Smoker     Packs/day: 1.50     Years: 20.00     Quit date: 1/27/2001    Smokeless tobacco: Former User    Alcohol use No     Patient returns today for follow-up. I see him for intractable epilepsy on several antiepileptic drugs as well as vagus nerve stimulator in place. In addition he has new diagnosis of mild cognitive impairment recently started on Aricept by our nurse practitioner, Kristin Siddiqui. In the interim since I saw him last he has tolerated the Aricept. No GI symptoms. No other side effects. He endorses compliance. He believes his memory is a little faster than usual.  Says that he does not think he is remembering more he just thinks that he is remembering a little faster. He and his wife are undergoing a separation at this point. Notes some emotional stress with that but he believes is for the best.  Notes that it is amicable. Continues to try to work through that with counseling.   Feels that it is for the best.  Working with his wife in terms of dealing with their son who is autistic and also has epilepsy. In terms of his epilepsy he denies any seizure. He denies any occult seizure symptom. He had his vagus nerve generator changed out recently due to battery life and. He notes he is tolerating stimulations. Does not really feel those. No medication side effects. Feels well in terms of his epilepsy. No occult symptoms. Examination  Visit Vitals    /88    Pulse 80    Temp 98.2 °F (36.8 °C)    Resp 18    Ht 6' 1\" (1.854 m)    Wt 86.2 kg (190 lb)    SpO2 99%    BMI 25.07 kg/m2     He is a very pleasant gentleman. He is awake alert oriented and conversant. He has normal speech-language cognition attention. Full versions without nystagmus. No pronation or drift. No ataxia. Steady gait. Regular heart rate. No edema. No icterus. Oropharynx clear moist.  Heart regular. Vagus nerve stimulator device is interrogated today. All parameters within normal.  No programming changes today. Impression/Plan  Partial epilepsy doing well. Continue current antiepileptic drug regimen. Continue vagus nerve stimulation. His vagus nerve stimulator is doing well status post generator change. Mild cognitive impairment. Continue with Aricept as is. Discussed role of treatment. Repeat neuropsychological evaluation in 18 months. Follow-up in 6 months and at that time we will schedule the repeat neuropsychological evaluation to ensure that we get that repeated in a timely fashion. Total time: 25 min   Counseling / coordination time: 15 min   > 50% counseling / coordination?: Yes re: MCI, dementia, repeat Neuropsych eval, odds of progression to dementia, dual therapy and when to invoke, etc        This note was created using voice recognition software. Despite editing, there may be syntax errors. This note will not be viewable in [address]

## 2017-08-22 NOTE — PATIENT INSTRUCTIONS
Information Regarding Testing     If you have physican order for a test or a medication denied by your insurance company, this does not mean the test or medication is not appropriate for you as that is a medical decision, not a decision to be made by an insurance company representative or by an [facility] physician who has not interviewed and examined you. This is a decision to be made between you and your physician. The denial of services is a contractual matter between you and your insurance company, not an issue between your physician and the insurance company. If your test or medication is denied, you can take the following steps to help resolve the issue:    1. File a complaint with the [facility] regarding your insurance company's denial of services ordered for you. You can do this either by calling them directly or by completing an on-line complaint form on the [employer]. This can be found at www.Entitle    2. Also file a formal complaint with your insurance company and ask to have the name of the person denying the service so that you may explore a legal option should you be harmed by this denial of service. Again, the fact the insurance company will not pay for the service does not mean it is not medically necessary and I would encourage you to follow through with the plan that was made with your physician    3. File a written complaint with your employer so your employer and benefit manager is aware of the poor coverage they are providing their employees. If you have medicare/medicaid, complain to your representative in the House and to your Judith Dick.     [facility]   Statement to Patients  April 1, 2014      In an effort to ensure the large volume of patient prescription refills is processed in the most efficient and expeditious manner, we are asking our patients to assist us by calling your Pharmacy for all prescription refills, this will include also your  Mail Order Pharmacy. The pharmacy will contact our office electronically to continue the refill process. Please do not wait until the last minute to call your pharmacy. We need at least 48 hours (2days) to fill prescriptions. We also encourage you to call your pharmacy before going to  your prescription to make sure it is ready. With regard to controlled substance prescription refill requests (narcotic refills) that need to be picked up at our office, we ask your cooperation by providing us with at least 72 hours (3days) notice that you will need a refill. We will not refill narcotic prescription refill requests after 4:00pm on any weekday, Monday through Thursday, or after 2:00pm on Fridays, or on the weekends. We encourage everyone to explore another way of getting your prescription refill request processed using Mobisante, our patient web portal through our electronic medical record system. Mobisante is an efficient and effective way to communicate your medication request directly to the office and  downloadable as an natacha on your smart phone . Mobisante also features a review functionality that allows you to view your medication list as well as leave messages for your physician. Are you ready to get connected? If so please review the attatched instructions or speak to any of our staff to get you set up right away! Thank you so much for your cooperation. Should you have any questions please contact our Practice Administrator. The Physicians and Staff,  [facility]     If we have ordered testing for you, we do not call patients with results and we do not give test results over the phone. We schedule follow up appointments so that your results can be discussed in person and any questions you have regarding them may be addressed.   If something of concern is revealed on your test, we will call you for a sooner follow up appointment. Additionally, results may be found by using the My Chart feature and one of our patient service representatives at the  can give you instructions on how to access this feature of our electronic medical record system.

## 2017-11-09 DIAGNOSIS — G40.219 PARTIAL EPILEPSY WITH IMPAIRMENT OF CONSCIOUSNESS, INTRACTABLE (HCC): ICD-10-CM

## 2017-11-09 RX ORDER — LACOSAMIDE 200 MG/1
TABLET ORAL
Qty: 405 TAB | Refills: 5 | Status: CANCELLED | OUTPATIENT
Start: 2017-11-09

## 2017-11-10 ENCOUNTER — TELEPHONE (OUTPATIENT)
Dept: NEUROLOGY | Age: 57
End: 2017-11-10

## 2017-11-10 DIAGNOSIS — G40.219 PARTIAL EPILEPSY WITH IMPAIRMENT OF CONSCIOUSNESS, INTRACTABLE (HCC): ICD-10-CM

## 2017-11-10 RX ORDER — LACOSAMIDE 200 MG/1
TABLET ORAL
Qty: 405 TAB | Refills: 1 | Status: SHIPPED | OUTPATIENT
Start: 2017-11-10 | End: 2018-01-29 | Stop reason: SDUPTHER

## 2017-11-10 RX ORDER — DONEPEZIL HYDROCHLORIDE 10 MG/1
10 TABLET, FILM COATED ORAL
Qty: 90 TAB | Refills: 1 | Status: SHIPPED | OUTPATIENT
Start: 2017-11-10 | End: 2017-11-15 | Stop reason: SDUPTHER

## 2017-11-15 RX ORDER — DONEPEZIL HYDROCHLORIDE 10 MG/1
10 TABLET, FILM COATED ORAL
Qty: 90 TAB | Refills: 1 | Status: SHIPPED | OUTPATIENT
Start: 2017-11-15 | End: 2019-05-28 | Stop reason: SDUPTHER

## 2017-11-15 NOTE — TELEPHONE ENCOUNTER
Patient phone number [phone]  Patient calling regarding his refill has not yet been sent to his pharmacy and he states he ran out of his meds. Requested Prescriptions     Pending Prescriptions Disp Refills    donepezil (ARICEPT) 10 mg tablet 90 Tab 1     Sig: Take 1 Tab by mouth nightly.

## 2017-11-29 ENCOUNTER — OFFICE VISIT (OUTPATIENT)
Dept: NEUROLOGY | Age: 57
End: 2017-11-29

## 2017-11-29 VITALS
OXYGEN SATURATION: 98 % | DIASTOLIC BLOOD PRESSURE: 78 MMHG | RESPIRATION RATE: 17 BRPM | BODY MASS INDEX: 26.51 KG/M2 | SYSTOLIC BLOOD PRESSURE: 120 MMHG | TEMPERATURE: 97.9 F | HEART RATE: 68 BPM | HEIGHT: 73 IN | WEIGHT: 200 LBS

## 2017-11-29 DIAGNOSIS — G40.219 PARTIAL EPILEPSY WITH IMPAIRMENT OF CONSCIOUSNESS, INTRACTABLE (HCC): Primary | ICD-10-CM

## 2017-11-29 RX ORDER — ALPRAZOLAM 0.25 MG/1
TABLET ORAL
Qty: 60 TAB | Refills: 1 | Status: SHIPPED | OUTPATIENT
Start: 2017-11-29 | End: 2018-01-29 | Stop reason: SDUPTHER

## 2017-11-29 RX ORDER — BUSPIRONE HYDROCHLORIDE 7.5 MG/1
7.5 TABLET ORAL 2 TIMES DAILY
Qty: 60 TAB | Refills: 3 | Status: SHIPPED | OUTPATIENT
Start: 2017-11-29 | End: 2018-01-19 | Stop reason: SDUPTHER

## 2017-11-29 RX ORDER — LACOSAMIDE 100 MG/1
TABLET ORAL
Qty: 56 TAB | Refills: 0 | Status: SHIPPED | COMMUNITY
Start: 2017-11-29 | End: 2018-01-29 | Stop reason: SDUPTHER

## 2017-11-29 NOTE — MR AVS SNAPSHOT
Visit Information Date & Time Provider Department Dept. Phone Encounter #  
 11/29/2017  8:40 AM Miguel Ángel Arroyo MD [facility] [phone] [id number] Follow-up Instructions Return in about 3 months (around 2/28/2018). Your Appointments 11/29/2017  8:40 AM  
Follow Up with Miguel Ángel Arroyo MD  
[facility] Appt Note: seizure kathy; f/u seizure [facility] 11/10/17; R/S [facility] 11/27/17; Phytel cancellation; f/u seizure [facility] 11/29/17 [place] [address] [facility] [address] [facility] [id number] [phone]  
  
   
 [place] [address] [id number] [address] [place] Upcoming Health Maintenance Date Due Hepatitis C Screening 1960 DTaP/Tdap/Td series (1 - Tdap) 9/20/1981 FOBT Q 1 YEAR AGE 50-75 9/20/2010 Influenza Age 5 to Adult 8/1/2017 Allergies as of 11/29/2017  Review Complete On: 11/29/2017 By: Miguel Ángel Arroyo MD  
  
 Severity Noted Reaction Type Reactions Tegretol [Carbamazepine]  01/12/2011    Rash Current Immunizations  Never Reviewed No immunizations on file. Not reviewed this visit You Were Diagnosed With   
  
 Codes Comments Partial epilepsy with impairment of consciousness, intractable ([facility]    -  Primary ICD-10-CM: T12.024 ICD-9-CM: 345.41 Vitals BP Pulse Temp Resp Height(growth percentile) Weight(growth percentile) 120/78 68 97.9 °F (36.6 °C) 17 6' 1\" (1.854 m) 200 lb (90.7 kg) SpO2 BMI Smoking Status 98% 26.39 kg/m2 Former Smoker Vitals History BMI and BSA Data Body Mass Index Body Surface Area  
 26.39 kg/m 2 2.16 m 2 Preferred Pharmacy Pharmacy Name Phone [facility] [address] [place] [phone] Your Updated Medication List  
  
   
This list is accurate as of: 11/29/17  8:34 AM.  Always use your most recent med list.  
  
  
  
  
 ALPRAZolam 0.25 mg tablet Commonly known as:  XANAX  
1 po q12 hours prn anxiety--may cause drowsiness  
  
 busPIRone 7.5 mg tablet Commonly known as:  BUSPAR Take 1 Tab by mouth two (2) times a day. chlorthalidone 25 mg tablet Commonly known as:  Neida Pulling Take  by mouth daily. donepezil 10 mg tablet Commonly known as:  ARICEPT Take 1 Tab by mouth nightly. ibuprofen 800 mg tablet Commonly known as:  MOTRIN Take 800 mg by mouth every eight (8) hours as needed for Pain. lacosamide 200 mg Tab tablet Commonly known as:  VIMPAT  
take 1 and 1/2 tablets by mouth three times a day  
  
 levETIRAcetam 750 mg tablet Commonly known as:  KEPPRA Take 4 tabs tid PAXIL 40 mg tablet Generic drug:  PARoxetine Take 40 mg by mouth daily. tadalafil 5 mg tablet Commonly known as:  CIALIS Take 5 mg by mouth daily. Prescriptions Printed Refills ALPRAZolam (XANAX) 0.25 mg tablet 1 Si po q12 hours prn anxiety--may cause drowsiness Class: Print Prescriptions Sent to Pharmacy Refills  
 busPIRone (BUSPAR) 7.5 mg tablet 3 Sig: Take 1 Tab by mouth two (2) times a day. Class: Normal  
 Pharmacy: [address] #: [phone] Route: Oral  
  
We Performed the Following [place] ELEC BAYRON NSTIM PLS GEN BRN/SC/PERPH W/O REPRGRM [70449 CPT(R)] Follow-up Instructions Return in about 3 months (around 2018). Patient Instructions Information Regarding Testing If you have physican order for a test or a medication denied by your insurance company, this does not mean the test or medication is not appropriate for you as that is a medical decision, not a decision to be made by an insurance company representative or by an [employer] physician who has not interviewed and examined you.  This is a decision to be made between you and your physician. The denial of services is a contractual matter between you and your insurance company, not an issue between your physician and the insurance company. If your test or medication is denied, you can take the following steps to help resolve the issue: 1. File a complaint with the [facility] regarding your insurance company's denial of services ordered for you. You can do this either by calling them directly or by completing an on-line complaint form on the [employer]. This can be found at www.[place].Acheive CCA 2. Also file a formal complaint with your insurance company and ask to have the name of the person denying the service so that you may explore a legal option should you be harmed by this denial of service. Again, the fact the insurance company will not pay for the service does not mean it is not medically necessary and I would encourage you to follow through with the plan that was made with your physician 3. File a written complaint with your employer so your employer and benefit manager is aware of the poor coverage they are providing their employees. If you have medicare/medicaid, complain to your representative in the House and to your Judith Dick. PRESCRIPTION REFILL POLICY [facility] Statement to Patients April 1, 2014 In an effort to ensure the large volume of patient prescription refills is processed in the most efficient and expeditious manner, we are asking our patients to assist us by calling your Pharmacy for all prescription refills, this will include also your  Mail Order Pharmacy. The pharmacy will contact our office electronically to continue the refill process. Please do not wait until the last minute to call your pharmacy. We need at least 48 hours (2days) to fill prescriptions.  We also encourage you to call your pharmacy before going to  your prescription to make sure it is ready. With regard to controlled substance prescription refill requests (narcotic refills) that need to be picked up at our office, we ask your cooperation by providing us with at least 72 hours (3days) notice that you will need a refill. We will not refill narcotic prescription refill requests after 4:00pm on any weekday, Monday through Thursday, or after 2:00pm on Fridays, or on the weekends. We encourage everyone to explore another way of getting your prescription refill request processed using Callix Brasil, our patient web portal through our electronic medical record system. [employer] is an efficient and effective way to communicate your medication request directly to the office and  downloadable as an natacha on your smart phone . [employer] also features a review functionality that allows you to view your medication list as well as leave messages for your physician. Are you ready to get connected? If so please review the attatched instructions or speak to any of our staff to get you set up right away! Thank you so much for your cooperation. Should you have any questions please contact our Practice Administrator. The Physicians and Staff,  [facility] If we have ordered testing for you, we do not call patients with results and we do not give test results over the phone. We schedule follow up appointments so that your results can be discussed in person and any questions you have regarding them may be addressed. If something of concern is revealed on your test, we will call you for a sooner follow up appointment. Additionally, results may be found by using the My Chart feature and one of our patient service representatives at the  can give you instructions on how to access this feature of our electronic medical record system. [address]ma [address] What is a living will? A living will is a legal form you use to write down the kind of care you want at the end of your life. It is used by the health professionals who will treat you if you aren't able to decide for yourself. If you put your wishes in writing, your loved ones and others will know what kind of care you want. They won't need to guess. This can ease your mind and be helpful to others. A living will is not the same as an estate or property will. An estate will explains what you want to happen with your money and property after you die. Is a living will a legal document? A living will is a legal document. Each state has its own laws about living fermin. If you move to another state, make sure that your living will is legal in the state where you now live. Or you might use a universal form that has been approved by many states. This kind of form can sometimes be completed and stored online. Your electronic copy will then be available wherever you have a connection to the Internet. In most cases, doctors will respect your wishes even if you have a form from a different state. · You don't need an  to complete a living will. But legal advice can be helpful if your state's laws are unclear, your health history is complicated, or your family can't agree on what should be in your living will. · You can change your living will at any time. Some people find that their wishes about end-of-life care change as their health changes. · In addition to making a living will, think about completing a medical power of  form. This form lets you name the person you want to make end-of-life treatment decisions for you (your \"health care agent\") if you're not able to. Many hospitals and nursing homes will give you the forms you need to complete a living will and a medical power of .  
· Your living will is used only if you can't make or communicate decisions for yourself anymore. If you become able to make decisions again, you can accept or refuse any treatment, no matter what you wrote in your living will. · Your state may offer an online registry. This is a place where you can store your living will online so the doctors and nurses who need to treat you can find it right away. What should you think about when creating a living will? Talk about your end-of-life wishes with your family members and your doctor. Let them know what you want. That way the people making decisions for you won't be surprised by your choices. Think about these questions as you make your living will: · Do you know enough about life support methods that might be used? If not, talk to your doctor so you know what might be done if you can't breathe on your own, your heart stops, or you're unable to swallow. · What things would you still want to be able to do after you receive life-support methods? Would you want to be able to walk? To speak? To eat on your own? To live without the help of machines? · If you have a choice, where do you want to be cared for? In your home? At a hospital or nursing home? · Do you want certain Religion practices performed if you become very ill? · If you have a choice at the end of your life, where would you prefer to die? At home? In a hospital or nursing home? Somewhere else? · Would you prefer to be buried or cremated? · Do you want your organs to be donated after you die? What should you do with your living will? · Make sure that your family members and your health care agent have copies of your living will. · Give your doctor a copy of your living will to keep in your medical record. If you have more than one doctor, make sure that each one has a copy. · You may want to put a copy of your living will where it can be easily found. Where can you learn more? Go to http://blossom-emma.info/. Enter W464 in the search box to learn more about \"Learning About Living Jamey Hernandez. \" Current as of: September 24, 2016 Content Version: 11.4 © 9720-2499 Healthwise, BookShout!. Care instructions adapted under license by [employer] (which disclaims liability or warranty for this information). If you have questions about a medical condition or this instruction, always ask your healthcare professional. [address] any warranty or liability for your use of this information. Introducing [facility] & HEALTH SERVICES! Dear Gera Velázquez: Thank you for requesting a Chipolo account. Our records indicate that you already have an active Chipolo account. You can access your account anytime at [email or web address] Did you know that you can access your hospital and ER discharge instructions at any time in Chipolo? You can also review all of your test results from your hospital stay or ER visit. Additional Information If you have questions, please visit the Frequently Asked Questions section of the Chipolo website at [email or web address] Remember, Chipolo is NOT to be used for urgent needs. For medical emergencies, dial 911. Now available from your iPhone and Android! Please provide this summary of care documentation to your next provider. Your primary care clinician is listed as Geeta Pearson. If you have any questions after today's visit, please call [phone].

## 2017-11-29 NOTE — PATIENT INSTRUCTIONS
Information Regarding Testing     If you have physican order for a test or a medication denied by your insurance company, this does not mean the test or medication is not appropriate for you as that is a medical decision, not a decision to be made by an insurance company representative or by an [facility] physician who has not interviewed and examined you. This is a decision to be made between you and your physician. The denial of services is a contractual matter between you and your insurance company, not an issue between your physician and the insurance company. If your test or medication is denied, you can take the following steps to help resolve the issue:    1. File a complaint with the [facility] regarding your insurance company's denial of services ordered for you. You can do this either by calling them directly or by completing an on-line complaint form on the [employer]. This can be found at [email or web address]    2. Also file a formal complaint with your insurance company and ask to have the name of the person denying the service so that you may explore a legal option should you be harmed by this denial of service. Again, the fact the insurance company will not pay for the service does not mean it is not medically necessary and I would encourage you to follow through with the plan that was made with your physician    3. File a written complaint with your employer so your employer and benefit manager is aware of the poor coverage they are providing their employees. If you have medicare/medicaid, complain to your representative in the House and to your Judith Dick.     [facility]   Statement to Patients  April 1, 2014      In an effort to ensure the large volume of patient prescription refills is processed in the most efficient and expeditious manner, we are asking our patients to assist us by calling your Pharmacy for all prescription refills, this will include also your  Mail Order Pharmacy. The pharmacy will contact our office electronically to continue the refill process. Please do not wait until the last minute to call your pharmacy. We need at least 48 hours (2days) to fill prescriptions. We also encourage you to call your pharmacy before going to  your prescription to make sure it is ready. With regard to controlled substance prescription refill requests (narcotic refills) that need to be picked up at our office, we ask your cooperation by providing us with at least 72 hours (3days) notice that you will need a refill. We will not refill narcotic prescription refill requests after 4:00pm on any weekday, Monday through Thursday, or after 2:00pm on Fridays, or on the weekends. We encourage everyone to explore another way of getting your prescription refill request processed using [employer], our patient web portal through our electronic medical record system. [employer] is an efficient and effective way to communicate your medication request directly to the office and  downloadable as an natacha on your smart phone . [employer] also features a review functionality that allows you to view your medication list as well as leave messages for your physician. Are you ready to get connected? If so please review the attatched instructions or speak to any of our staff to get you set up right away! Thank you so much for your cooperation. Should you have any questions please contact our Practice Administrator. The Physicians and Staff,  [facility]     If we have ordered testing for you, we do not call patients with results and we do not give test results over the phone. We schedule follow up appointments so that your results can be discussed in person and any questions you have regarding them may be addressed.   If something of concern is revealed on your test, we will call you for a sooner follow up appointment. Additionally, results may be found by using the My Chart feature and one of our patient service representatives at the  can give you instructions on how to access this feature of our electronic medical record system. Learning About Living [place]  What is a living will? A living will is a legal form you use to write down the kind of care you want at the end of your life. It is used by the health professionals who will treat you if you aren't able to decide for yourself. If you put your wishes in writing, your loved ones and others will know what kind of care you want. They won't need to guess. This can ease your mind and be helpful to others. A living will is not the same as an estate or property will. An estate will explains what you want to happen with your money and property after you die. Is a living will a legal document? A living will is a legal document. Each state has its own laws about living [place]. If you move to another state, make sure that your living will is legal in the state where you now live. Or you might use a universal form that has been approved by many states. This kind of form can sometimes be completed and stored online. Your electronic copy will then be available wherever you have a connection to the Internet. In most cases, doctors will respect your wishes even if you have a form from a different state. · You don't need an  to complete a living will. But legal advice can be helpful if your state's laws are unclear, your health history is complicated, or your family can't agree on what should be in your living will. · You can change your living will at any time. Some people find that their wishes about end-of-life care change as their health changes. · In addition to making a living will, think about completing a medical power of  form.  This form lets you name the person you want to make end-of-life treatment decisions for you (your \"health care agent\") if you're not able to. Many hospitals and nursing homes will give you the forms you need to complete a living will and a medical power of . · Your living will is used only if you can't make or communicate decisions for yourself anymore. If you become able to make decisions again, you can accept or refuse any treatment, no matter what you wrote in your living will. · Your state may offer an online registry. This is a place where you can store your living will online so the doctors and nurses who need to treat you can find it right away. What should you think about when creating a living will? Talk about your end-of-life wishes with your family members and your doctor. Let them know what you want. That way the people making decisions for you won't be surprised by your choices. Think about these questions as you make your living will:  · Do you know enough about life support methods that might be used? If not, talk to your doctor so you know what might be done if you can't breathe on your own, your heart stops, or you're unable to swallow. · What things would you still want to be able to do after you receive life-support methods? Would you want to be able to walk? To speak? To eat on your own? To live without the help of machines? · If you have a choice, where do you want to be cared for? In your home? At a hospital or nursing home? · Do you want certain Hinduism practices performed if you become very ill? · If you have a choice at the end of your life, where would you prefer to die? At home? In a hospital or nursing home? Somewhere else? · Would you prefer to be buried or cremated? · Do you want your organs to be donated after you die? What should you do with your living will? · Make sure that your family members and your health care agent have copies of your living will. · Give your doctor a copy of your living will to keep in your medical record.  If you have more than one doctor, make sure that each one has a copy. · You may want to put a copy of your living will where it can be easily found. Where can you learn more? Go to http://blossom-emma.info/. Enter V553 in the search box to learn more about \"Learning About Living [place]. \"  Current as of: September 24, 2016  Content Version: 11.4  © 2523-6072 [employer]. Care instructions adapted under license by [employer] (which disclaims liability or warranty for this information). If you have questions about a medical condition or this instruction, always ask your healthcare professional. [address] any warranty or liability for your use of this information.

## 2017-11-29 NOTE — PROGRESS NOTES
[address]   [address]   [address]    [facility]   [phone] Fax               Chief Complaint   Patient presents with    Seizure     follow up     Current Outpatient Prescriptions on File Prior to Visit   Medication Sig Dispense Refill    donepezil (ARICEPT) 10 mg tablet Take 1 Tab by mouth nightly. 90 Tab 1    lacosamide (VIMPAT) 200 mg tab tablet take 1 and 1/2 tablets by mouth three times a day 405 Tab 1    levETIRAcetam (KEPPRA) 750 mg tablet Take 4 tabs tid 1080 Tab 1    ibuprofen (MOTRIN) 800 mg tablet Take 800 mg by mouth every eight (8) hours as needed for Pain.  chlorthalidone (HYGROTEN) 25 mg tablet Take  by mouth daily.  tadalafil (CIALIS) 5 mg tablet Take 5 mg by mouth daily.  PARoxetine (PAXIL) 40 mg tablet Take 40 mg by mouth daily. No current facility-administered medications on file prior to visit. Allergies   Allergen Reactions    Tegretol [Carbamazepine] Rash     Social History   Substance Use Topics    Smoking status: Former Smoker     Packs/day: 1.50     Years: 20.00     Quit date: 1/27/2001    Smokeless tobacco: Former User    Alcohol use No     Mr. Vilma Clayton returns today for follow-up epilepsy with vagus nerve stimulator implanted. He is maintained on Keppra and Vimpat for his antiepileptic drugs. He endorses compliance. He denies any medication side effects. He has had some difficulty in establishing regular refills with his [employer] company and our nursing staff as well as the patient had been working to make sure he has an uninterrupted supply of medication. He denies seizure. He denies any occult seizure symptom. Denies any diplopia imbalance falls. He continues to work and is staying busy there. He is tolerating vagus nerve stimulator stimulations. No pain. No other side effects related to that. He has the model 106 aspire device.   This was recently replaced in July. He has been having some increased anxiety and depression. He is seeing a counselor MsDilcia Young and they discussed using medication for his anxiety such as BuSpar. We discussed that today. He had also discussed with his counselor some as needed Xanax while awaiting the effectiveness of BuSpar. He and his wife are in the midst of  and he gets a bit tearful today when discussing the social situation. He denies any suicidal or homicidal ideations. He continues to seek therapy and he is hopeful that his wife will also attend some of the therapy sessions. In addition the patient also has mild cognitive impairment as evidenced by neuropsychological evaluation. He was started on Aricept and titrated to 10 mg. He notes he has no side effects there. He believes his memory is better. He has not had any dangerous behaviors again continues to work his living on his own etc.    Review of systems  Pertinent positives and negatives are as noted above otherwise comprehensive systems review is negative. Examination  Visit Vitals    /78    Pulse 68    Temp 97.9 °F (36.6 °C)    Resp 17    Ht 6' 1\" (1.854 m)    Wt 90.7 kg (200 lb)    SpO2 98%    BMI 26.39 kg/m2     He is a very pleasant gentleman. Awake alert and oriented. He has appropriate dress and grooming. Again he gets a bit tearful when discussing his home situation but not out of the ordinary in terms of affect and that the response is appropriate and he recovers quite quickly. No icterus. No edema. Heart regular. Neurologically he is with full versions and no nystagmus. Symmetric face. Tongue and palate midline. No pronation or drift. No ataxia. Gait is steady. Vagus nerve stimulator was interrogated today and all parameters found to be working properly. The auto stem settings are not active at present.     Impression/Plan  Epilepsy with vagus nerve stimulator in place and 2 antiepileptic drugs-continue same antiepileptic drug regimen. Continue our present vagus nerve stimulator settings. I did not activate the auto stem feature of the aspire device secondary to the fact that he is not having seizures at this point and therefore there is no need to change his stimulator settings    Mild cognitive impairment which is stable at present. Continue Aricept. Continue to stay active. Changing social situation-continue to work with his wife and work with therapy to help deal with that    Anxiety as discussed above which is a new complaint to me. We discussed starting some BuSpar and we discussed the administration, potential side effects potential benefits and alternatives. I gave him a short prescription of Xanax 0.25 mg to use twice daily as needed for anxiety noting this could cause him somnolence and wrote that warning on the bottle as well. Also discussed other potential side effects. He knows that this is just a short-term prescription until the BuSpar hopefully will kick in. Continue with counseling. Follow-up in 3 months secondary to the fact that we instituted new medications. This note was created using voice recognition software. Despite editing, there may be syntax errors. This note will not be viewable in [address]

## 2018-01-17 RX ORDER — BUSPIRONE HYDROCHLORIDE 7.5 MG/1
7.5 TABLET ORAL 2 TIMES DAILY
Qty: 60 TAB | Refills: 3 | Status: CANCELLED | OUTPATIENT
Start: 2018-01-17

## 2018-01-17 RX ORDER — ALPRAZOLAM 0.25 MG/1
TABLET ORAL
Qty: 60 TAB | Refills: 1 | Status: CANCELLED | OUTPATIENT
Start: 2018-01-17

## 2018-01-17 NOTE — TELEPHONE ENCOUNTER
Pt requested refill on Rxs(\"Alprazolam\" . 25 mg and \"Buspirone HCL\" 7.5 mg) called to [facility] [phone].  Best contact number [phone].

## 2018-01-19 ENCOUNTER — TELEPHONE (OUTPATIENT)
Dept: NEUROLOGY | Age: 58
End: 2018-01-19

## 2018-01-19 RX ORDER — BUSPIRONE HYDROCHLORIDE 7.5 MG/1
7.5 TABLET ORAL 2 TIMES DAILY
Qty: 60 TAB | Refills: 3 | Status: SHIPPED | OUTPATIENT
Start: 2018-01-19 | End: 2018-01-29 | Stop reason: SDUPTHER

## 2018-01-19 NOTE — TELEPHONE ENCOUNTER
----- Message from Tyrone Harris sent at 1/19/2018  2:00 PM EST -----  Regarding: Dr. Meño Melendez  Pt needs refills for Buspirone and Alprazolam sent to the [employer] [facility]  located on [facility] today. Pt will run out of the medications after today. Pt called on Wednesday morning to request refills and was told that the refill would be completed within 8 hours. Pt can be reached at [phone].

## 2018-01-19 NOTE — TELEPHONE ENCOUNTER
No call logged from patient. VO for refill buspur escribed to [facility] and xanax script faxed tp pharmacy.

## 2018-01-29 ENCOUNTER — TELEPHONE (OUTPATIENT)
Dept: NEUROLOGY | Age: 58
End: 2018-01-29

## 2018-01-29 DIAGNOSIS — G40.219 PARTIAL EPILEPSY WITH IMPAIRMENT OF CONSCIOUSNESS, INTRACTABLE (HCC): ICD-10-CM

## 2018-01-29 DIAGNOSIS — R56.9 SEIZURE (HCC): Primary | ICD-10-CM

## 2018-01-29 RX ORDER — ALPRAZOLAM 0.25 MG/1
TABLET ORAL
Qty: 60 TAB | Refills: 1 | Status: SHIPPED | OUTPATIENT
Start: 2018-01-29 | End: 2018-02-21

## 2018-01-29 RX ORDER — LEVETIRACETAM 750 MG/1
TABLET ORAL
Qty: 1080 TAB | Refills: 1 | Status: SHIPPED | OUTPATIENT
Start: 2018-01-29 | End: 2018-10-02 | Stop reason: ALTCHOICE

## 2018-01-29 RX ORDER — BUSPIRONE HYDROCHLORIDE 7.5 MG/1
7.5 TABLET ORAL 2 TIMES DAILY
Qty: 60 TAB | Refills: 3 | Status: SHIPPED | OUTPATIENT
Start: 2018-01-29 | End: 2018-01-29 | Stop reason: SDUPTHER

## 2018-01-29 RX ORDER — LACOSAMIDE 200 MG/1
TABLET ORAL
Qty: 405 TAB | Refills: 1 | Status: SHIPPED | OUTPATIENT
Start: 2018-01-29 | End: 2018-08-03 | Stop reason: SDUPTHER

## 2018-01-29 RX ORDER — BUSPIRONE HYDROCHLORIDE 7.5 MG/1
7.5 TABLET ORAL 2 TIMES DAILY
Qty: 180 TAB | Refills: 1 | Status: SHIPPED | OUTPATIENT
Start: 2018-01-29 | End: 2018-06-07

## 2018-01-29 RX ORDER — LACOSAMIDE 100 MG/1
TABLET ORAL
Qty: 56 TAB | Refills: 0 | Status: SHIPPED | OUTPATIENT
Start: 2018-01-29 | End: 2018-02-21

## 2018-01-29 NOTE — TELEPHONE ENCOUNTER
----- Message from Angeles Castro sent at 1/29/2018  3:11 PM EST -----  Regarding: Dr. Jessica Leblanc  The patient spoke with a young man this morning around 9:30am and was told that a nurse would call him back in two hours. The patient is requesting a call back in regards to Rx's Keppra, Rx Buspirone 7.5mg, and Rx Alprazolam .25mg not being called into the pharmacy as he was told.  ([address] on file) (s)[phone]

## 2018-01-29 NOTE — TELEPHONE ENCOUNTER
----- Message from Mikel Huber sent at 1/29/2018 10:26 AM EST -----  Regarding: Dr. Yumiko Fofana  Pt request for a call in regards his medication \"Keppra\", \"Vimpap\", Buspirone\" 7.5 mg and \"Alprazolam\" . 25 mg being sent to his [facility] [phone]. He states that they should all be a 90 day suppply. His best contact number is [phone].

## 2018-02-21 ENCOUNTER — TELEPHONE (OUTPATIENT)
Dept: NEUROLOGY | Age: 58
End: 2018-02-21

## 2018-02-21 ENCOUNTER — OFFICE VISIT (OUTPATIENT)
Dept: NEUROLOGY | Age: 58
End: 2018-02-21

## 2018-02-21 VITALS
WEIGHT: 200.6 LBS | OXYGEN SATURATION: 97 % | HEART RATE: 68 BPM | HEIGHT: 73 IN | RESPIRATION RATE: 20 BRPM | SYSTOLIC BLOOD PRESSURE: 130 MMHG | BODY MASS INDEX: 26.59 KG/M2 | DIASTOLIC BLOOD PRESSURE: 86 MMHG

## 2018-02-21 DIAGNOSIS — F41.9 ANXIETY: ICD-10-CM

## 2018-02-21 DIAGNOSIS — G40.219 PARTIAL EPILEPSY WITH IMPAIRMENT OF CONSCIOUSNESS, INTRACTABLE (HCC): Primary | ICD-10-CM

## 2018-02-21 DIAGNOSIS — G31.84 MCI (MILD COGNITIVE IMPAIRMENT): ICD-10-CM

## 2018-02-21 RX ORDER — FLUOXETINE 20 MG/1
20 TABLET ORAL DAILY
Qty: 60 TAB | Refills: 3 | Status: SHIPPED | OUTPATIENT
Start: 2018-02-21 | End: 2018-02-28

## 2018-02-21 NOTE — PROGRESS NOTES
Date:  18     Name:  Ivanna Tompkins  :  1960  MRN:  [id number]     PCP:  Mahesh Fischer MD    Chief Complaint   Patient presents with    Epilepsy     HISTORY OF PRESENT ILLNESS: Follow up for epilepsy. No seizures since last visit. He continues to take Vimpat 300mg QAM, 200mg at noon, and 400mg QPM as well as Keppra 3000mg tid. He has not had any signs or symptoms of toxicity or side effect. Currently, his more pressing issue is that of anxiety. He is currently going through a divorce and is anxiety has been particularly bad. He was given Xanax and Buspar by Dr. Bia Baxter at his last office visit and does not feel that either of these options have been very effective. Except as noted above, denies  fever, chills, cough. No CP or SOB. No dysuria, loss of bowel or bladder control. No Weight loss. Appetite good. Sleeping well. No sweats. No edema. No bruising or bleeding. No nausea or vomit. No diarrhea. No frequency, urgency, No depressive sxs. No anxiety. Denies sore throat, nasal congestion, nasal discharge, epistaxis, tinnitus, hearing loss, back pain, muscle pain, or joint pain. Current Outpatient Prescriptions   Medication Sig    levETIRAcetam (KEPPRA) 750 mg tablet Take 4 tabs tid    lacosamide (VIMPAT) 200 mg tab tablet take 1 and 1/2 tablets by mouth three times a day (Patient taking differently: Takes 1.5 in the morning, 1 at lunch and 2 at bedtime)    busPIRone (BUSPAR) 7.5 mg tablet Take 1 Tab by mouth two (2) times a day.  donepezil (ARICEPT) 10 mg tablet Take 1 Tab by mouth nightly.  ibuprofen (MOTRIN) 800 mg tablet Take 800 mg by mouth every eight (8) hours as needed for Pain.  tadalafil (CIALIS) 5 mg tablet Take 5 mg by mouth daily. No current facility-administered medications for this visit.       Allergies   Allergen Reactions    Tegretol [Carbamazepine] Rash     Past Medical History:   Diagnosis Date    Burning with urination     Hypertension     Seizures ([facility] [address]     Sleep apnea     Pt has machine but does not use    Unspecified sleep apnea      Past Surgical History:   Procedure Laterality Date    HX OTHER SURGICAL  2005    vagus nerve stimulator     Social History     Social History    Marital status:      Spouse name: N/A    Number of children: N/A    Years of education: N/A     Occupational History    Not on file. Social History Main Topics    Smoking status: Former Smoker     Packs/day: 1.50     Years: 20.00     Quit date: 1/27/2001    Smokeless tobacco: Former User    Alcohol use No    Drug use: No    Sexual activity: Not on file     Other Topics Concern    Not on file     Social History Narrative     Family History   Problem Relation Age of Onset    Heart Disease Maternal Uncle     Cancer Father     Heart Disease Maternal Uncle        PHYSICAL EXAMINATION:    Visit Vitals    /86    Pulse 68    Resp 20    Ht 6' 1\" (1.854 m)    Wt 91 kg (200 lb 9.6 oz)    SpO2 97%    BMI 26.47 kg/m2     General:  Well defined, nourished, and groomed individual in no acute distress. Neck:             Supple, nontender, no bruits, no pain with resistance to active range of motion. Heart:            Regular rate and rhythm, no murmurs, rub, or gallop. Normal S1S2. Lungs:  Clear to auscultation bilaterally with equal chest expansion, no cough, no wheeze  Musculoskeletal:  Extremities revealed no edema and had full range of motion of joints. Psych:  Good mood and bright affect     NEUROLOGICAL EXAMINATION:     Mental Status:   Alert and oriented to person, place, and time with recent and remote memory intact. Attention span and concentration are normal. Speech is fluent with a full fund of knowledge.       Cranial Nerves:    II, III, IV, VI:  Visual acuity grossly intact. Visual fields are normal.    Pupils are equal, round, and reactive to light and accommodation.     Extra-ocular movements are full and fluid.  Fundoscopic exam was benign, no ptosis or nystagmus. V-XII:             Hearing is grossly intact. Facial features are symmetric, with normal sensation and strength. The palate rises symmetrically and the tongue protrudes midline. Sternocleidomastoids 5/5.       Motor Examination:               Normal tone, bulk, and strength, 5/5 muscle strength throughout. Coordination:  Finger to nose and rapid arm movement testing was normal.   No resting or intention tremor  Gait and Station:  Steady while walking. Normal arm swing. No pronator drift. No muscle wasting or fasiculations noted. Reflexes:  DTRs 2+ throughout.       VNS interrogated and settings are as follows: Output current: 2.0  Signal frequency: 30   Pulse width: 130  Signal on-time: 30 sec  Signal off-time: 5 min  Magnet current: 2.25   Magnet on-time: 60   Magnet pulse width: 250  AutoStim is not activated  Model AspireSR 106  Serial# [id number]  Implanted 2016-07-01    ASSESSMENT AND PLAN    ICD-10-CM ICD-9-CM    1. Partial epilepsy with impairment of consciousness, intractable (HCC) G40.219 345.41 MS ELEC BAYRON NSTIM PLS GEN BRN/SC/PERPH W/O REPRGRM   2. MCI (mild cognitive impairment) G31.84 331.83    3. Anxiety F41.9 300.00 FLUoxetine (PROZAC) 20 mg tablet     Epilepsy is currently stable on present therapy and VNS settings without side effect or difficulty from either medication or VNS stimulation. Continue with present settings and medications without change. With regard to the anxiety, we did discuss alternative therapy. He was will to start Prozac 20mg daily. Purpose and potential side effects were discussed and he has verbalized understanding. He will continue with the Buspar for now. Follow up in three months or sooner if needed pending his response. Betzaida Haque

## 2018-02-21 NOTE — MR AVS SNAPSHOT
[facility] 
 
 
 [place] [address] [id number] [phone] Patient: Samanta Lorenzo MRN: [id number] Visit Information Date & Time Provider Department Dept. Phone Encounter #  
 2/21/2018 10:00 AM [address] [facility] [phone] [id number] Your Appointments 2/21/2018 10:00 AM  
Follow Up with Ghazal Rod NP [facility] ([address]) Appt Note: f/u meds swc 02/21/18  
 [address] [address] [phone]  
  
   
 [place] [address]  
  
    
 2/27/2018  8:00 AM  
Follow Up with Theo Duncan MD  
[address] [facility]) Appt Note: seizure kathy  
 [phone] [facility] [address]  
[facility] [address] [facility] [address] Upcoming Health Maintenance Date Due Hepatitis C Screening 1960 DTaP/Tdap/Td series (1 - Tdap) 9/20/1981 FOBT Q 1 YEAR AGE 50-75 9/20/2010 Influenza Age 5 to Adult 8/1/2017 Allergies as of 2/21/2018  Review Complete On: 2/21/2018 By: Ghazal Rod NP Severity Noted Reaction Type Reactions Tegretol [Carbamazepine]  01/12/2011    Rash Current Immunizations  Never Reviewed No immunizations on file. Not reviewed this visit You Were Diagnosed With   
  
 Codes Comments Partial epilepsy with impairment of consciousness, intractable ([facility] [address]    -  Primary ICD-10-CM: R02.641 ICD-9-CM: 345.41   
 MCI (mild cognitive impairment)     ICD-10-CM: G31.84 ICD-9-CM: 331.83 Anxiety     ICD-10-CM: F41.9 ICD-9-CM: 300.00 Vitals BP Pulse Resp Height(growth percentile) Weight(growth percentile) SpO2  
 130/86 68 20 6' 1\" (1.854 m) 200 lb 9.6 oz (91 kg) 97% BMI Smoking Status 26.47 kg/m2 Former Smoker Vitals History BMI and BSA Data Body Mass Index Body Surface Area  
 26.47 kg/m 2 2.16 m 2 Preferred Pharmacy Pharmacy Name Phone [facility] [address] Cornelius Johnson [phone] Your Updated Medication List  
  
   
This list is accurate as of 2/21/18  9:54 AM.  Always use your most recent med list.  
  
  
  
  
 busPIRone 7.5 mg tablet Commonly known as:  BUSPAR Take 1 Tab by mouth two (2) times a day. donepezil 10 mg tablet Commonly known as:  ARICEPT Take 1 Tab by mouth nightly. FLUoxetine 20 mg tablet Commonly known as:  PROzac Take 1 Tab by mouth daily for 7 days. Then increase to 40mg thereafter  
  
 ibuprofen 800 mg tablet Commonly known as:  MOTRIN Take 800 mg by mouth every eight (8) hours as needed for Pain. lacosamide 200 mg Tab tablet Commonly known as:  VIMPAT  
take 1 and 1/2 tablets by mouth three times a day  
  
 levETIRAcetam 750 mg tablet Commonly known as:  KEPPRA Take 4 tabs tid  
  
 tadalafil 5 mg tablet Commonly known as:  CIALIS Take 5 mg by mouth daily. Prescriptions Sent to Pharmacy Refills FLUoxetine (PROZAC) 20 mg tablet 3 Sig: Take 1 Tab by mouth daily for 7 days. Then increase to 40mg thereafter Class: Normal  
 Pharmacy: [facility] [address] #: [phone] Route: Oral  
  
Patient Instructions A Healthy Lifestyle: Care Instructions Your Care Instructions A healthy lifestyle can help you feel good, stay at a healthy weight, and have plenty of energy for both work and play. A healthy lifestyle is something you can share with your whole family. A healthy lifestyle also can lower your risk for serious health problems, such as high blood pressure, heart disease, and diabetes. You can follow a few steps listed below to improve your health and the health of your family. Follow-up care is a key part of your treatment and safety. Be sure to make and go to all appointments, and call your doctor if you are having problems. It's also a good idea to know your test results and keep a list of the medicines you take. How can you care for yourself at home? · Do not eat too much sugar, fat, or fast foods. You can still have dessert and treats now and then. The goal is moderation. · Start small to improve your eating habits. Pay attention to portion sizes, drink less juice and soda pop, and eat more fruits and vegetables. ¨ Eat a healthy amount of food. A 3-ounce serving of meat, for example, is about the size of a deck of cards. Fill the rest of your plate with vegetables and whole grains. ¨ Limit the amount of soda and sports drinks you have every day. Drink more water when you are thirsty. ¨ Eat at least 5 servings of fruits and vegetables every day. It may seem like a lot, but it is not hard to reach this goal. A serving or helping is 1 piece of fruit, 1 cup of vegetables, or 2 cups of leafy, raw vegetables. Have an apple or some carrot sticks as an afternoon snack instead of a candy bar. Try to have fruits and/or vegetables at every meal. 
· Make exercise part of your daily routine. You may want to start with simple activities, such as walking, bicycling, or slow swimming. Try to be active 30 to 60 minutes every day. You do not need to do all 30 to 60 minutes all at once. For example, you can exercise 3 times a day for 10 or 20 minutes. Moderate exercise is safe for most people, but it is always a good idea to talk to your doctor before starting an exercise program. 
· Keep moving. Joselinen  the lawn, work in the garden, or ViS. Take the stairs instead of the elevator at work. · If you smoke, quit.  People who smoke have an increased risk for heart attack, stroke, cancer, and other lung illnesses. Quitting is hard, but there are ways to boost your chance of quitting tobacco for good. ¨ Use nicotine gum, patches, or lozenges. ¨ Ask your doctor about stop-smoking programs and medicines. ¨ Keep trying. In addition to reducing your risk of diseases in the future, you will notice some benefits soon after you stop using tobacco. If you have shortness of breath or asthma symptoms, they will likely get better within a few weeks after you quit. · Limit how much alcohol you drink. Moderate amounts of alcohol (up to 2 drinks a day for men, 1 drink a day for women) are okay. But drinking too much can lead to liver problems, high blood pressure, and other health problems. Family health If you have a family, there are many things you can do together to improve your health. · Eat meals together as a family as often as possible. · Eat healthy foods. This includes fruits, vegetables, lean meats and dairy, and whole grains. · Include your family in your fitness plan. Most people think of activities such as jogging or tennis as the way to fitness, but there are many ways you and your family can be more active. Anything that makes you breathe hard and gets your heart pumping is exercise. Here are some tips: 
¨ Walk to do errands or to take your child to school or the bus. ¨ Go for a family bike ride after dinner instead of watching TV. Where can you learn more? Go to http://blossom-emma.info/. Enter F391 in the search box to learn more about \"A Healthy Lifestyle: Care Instructions. \" Current as of: May 12, 2017 Content Version: 11.4 © 5795-5342 [employer]. Care instructions adapted under license by [employer] (which disclaims liability or warranty for this information).  If you have questions about a medical condition or this instruction, always ask your healthcare professional. [employer] disclaims any warranty or liability for your use of this information. Introducing [address]! Dear Becky Freedman: Thank you for requesting a Buddy account. Our records indicate that you already have an active Buddy account. You can access your account anytime at [email or web address] Did you know that you can access your hospital and ER discharge instructions at any time in Buddy? You can also review all of your test results from your hospital stay or ER visit. Additional Information If you have questions, please visit the Frequently Asked Questions section of the Buddy website at [email or web address]/. Remember, Buddy is NOT to be used for urgent needs. For medical emergencies, dial 911. Now available from your iPhone and Android! Please provide this summary of care documentation to your next provider. Your primary care clinician is listed as Marialuisa Ortiz. If you have any questions after today's visit, please call [phone].

## 2018-02-21 NOTE — PROGRESS NOTES
Pt and his wife are going through a divorce   No seizures since he was here last- has been pretty well controlled since he started taking the vimpat   He was prescribed xanax with dr. Donnie Lee with his last visit and he stated it was not helping or working that he could tell, he was to take that BID until the buspar got into his system and he doesn't see any advantage to it  Therapist he has been seeing suggested prozac starting off at 10 mg and he would like to see if the buspar could be increased as well

## 2018-02-21 NOTE — PATIENT INSTRUCTIONS

## 2018-03-09 RX ORDER — DONEPEZIL HYDROCHLORIDE 10 MG/1
TABLET, FILM COATED ORAL
Qty: 90 TAB | Refills: 3 | Status: SHIPPED | OUTPATIENT
Start: 2018-03-09 | End: 2018-06-07 | Stop reason: SDUPTHER

## 2018-05-11 ENCOUNTER — TELEPHONE (OUTPATIENT)
Dept: NEUROLOGY | Age: 58
End: 2018-05-11

## 2018-05-11 NOTE — TELEPHONE ENCOUNTER
----- Message from Merline Sprague sent at 5/11/2018  9:32 AM EDT -----  Regarding: Dr. Lydia Garcia  Pt stated he need a prior authorization on his medication(\"Keppra\") and requested a call from the nurse. Best contact number [phone].

## 2018-05-14 ENCOUNTER — TELEPHONE (OUTPATIENT)
Dept: NEUROLOGY | Age: 58
End: 2018-05-14

## 2018-05-14 NOTE — TELEPHONE ENCOUNTER
----- Message from Ivanna Beach sent at 5/14/2018 11:15 AM EDT -----  Regarding: Dr. Juan F Weaver  Pt requesting to speak with provider in regards to medications. An appt is scheduled Thursday 06/17/18. 2:30 PM with NP iAcha Gaspar.  Best contact number [phone]

## 2018-05-15 NOTE — TELEPHONE ENCOUNTER
----- Message from Buster Rome sent at 5/15/2018 12:45 PM EDT -----  Regarding: Dr. Nupur Serrano  Pt stated he received 2 calls from the office, and would like a call back. Best contact number [phone].

## 2018-05-23 ENCOUNTER — TELEPHONE (OUTPATIENT)
Dept: NEUROLOGY | Age: 58
End: 2018-05-23

## 2018-06-07 ENCOUNTER — OFFICE VISIT (OUTPATIENT)
Dept: NEUROLOGY | Age: 58
End: 2018-06-07

## 2018-06-07 VITALS
HEIGHT: 73 IN | SYSTOLIC BLOOD PRESSURE: 138 MMHG | BODY MASS INDEX: 26.51 KG/M2 | OXYGEN SATURATION: 99 % | WEIGHT: 200 LBS | RESPIRATION RATE: 20 BRPM | DIASTOLIC BLOOD PRESSURE: 84 MMHG | HEART RATE: 53 BPM

## 2018-06-07 DIAGNOSIS — G31.84 MCI (MILD COGNITIVE IMPAIRMENT): ICD-10-CM

## 2018-06-07 DIAGNOSIS — G40.219 PARTIAL EPILEPSY WITH IMPAIRMENT OF CONSCIOUSNESS, INTRACTABLE (HCC): Primary | ICD-10-CM

## 2018-06-07 DIAGNOSIS — R19.7 DIARRHEA, UNSPECIFIED TYPE: ICD-10-CM

## 2018-06-07 DIAGNOSIS — R63.4 WEIGHT LOSS: ICD-10-CM

## 2018-06-07 DIAGNOSIS — G31.84 MCI (MILD COGNITIVE IMPAIRMENT) WITH MEMORY LOSS: ICD-10-CM

## 2018-06-07 DIAGNOSIS — R53.83 FATIGUE, UNSPECIFIED TYPE: ICD-10-CM

## 2018-06-07 DIAGNOSIS — Z96.89 STATUS POST VNS (VAGUS NERVE STIMULATOR) PLACEMENT: ICD-10-CM

## 2018-06-07 RX ORDER — FLUOXETINE HYDROCHLORIDE 20 MG/1
CAPSULE ORAL
Refills: 0 | COMMUNITY
Start: 2018-03-23 | End: 2018-07-03

## 2018-06-07 NOTE — PROGRESS NOTES
Date:  18     Name:  Cindy Morales  :  1960  MRN:  [id number]     PCP:  Fabiola Nj MD    Chief Complaint   Patient presents with    Epilepsy     medication check      HISTORY OF PRESENT ILLNESS: Follow up for epilepsy, dementia, and depression with anxiety. At his last office visit, he was started on Prozac because he did not feel that the Buspar was helping. He has since stopped the Buspar because he did not feel he needed it. No seizures. Memory issues are about the same. The issues with his Keppra have been resolved. Except as noted above, denies  fever, chills, cough. No CP or SOB. No dysuria, loss of bowel or bladder control. No Weight loss. Appetite good. Sleeping well. No sweats. No edema. No bruising or bleeding. No nausea or vomit. No diarrhea. No frequency, urgency, No depressive sxs. No anxiety. Denies sore throat, nasal congestion, nasal discharge, epistaxis, tinnitus, hearing loss, back pain, muscle pain, or joint pain. Current Outpatient Prescriptions   Medication Sig    FLUoxetine (PROZAC) 20 mg capsule     levETIRAcetam (KEPPRA) 750 mg tablet Take 4 tabs tid    lacosamide (VIMPAT) 200 mg tab tablet take 1 and 1/2 tablets by mouth three times a day (Patient taking differently: Takes 1.5 in the morning, 1 at lunch and 2 at bedtime)    donepezil (ARICEPT) 10 mg tablet Take 1 Tab by mouth nightly.  ibuprofen (MOTRIN) 800 mg tablet Take 800 mg by mouth every eight (8) hours as needed for Pain.  tadalafil (CIALIS) 5 mg tablet Take 5 mg by mouth daily. No current facility-administered medications for this visit.       Allergies   Allergen Reactions    Tegretol [Carbamazepine] Rash     Past Medical History:   Diagnosis Date    Burning with urination     Hypertension     Seizures ([facility] [address]     Sleep apnea     Pt has machine but does not use    Unspecified sleep apnea      Past Surgical History:   Procedure Laterality Date    HX OTHER SURGICAL  2005    vagus nerve stimulator     Social History     Social History    Marital status:      Spouse name: N/A    Number of children: N/A    Years of education: N/A     Occupational History    Not on file. Social History Main Topics    Smoking status: Former Smoker     Packs/day: 1.50     Years: 20.00     Quit date: 1/27/2001    Smokeless tobacco: Former User    Alcohol use No    Drug use: No    Sexual activity: Not on file     Other Topics Concern    Not on file     Social History Narrative     Family History   Problem Relation Age of Onset    Heart Disease Maternal Uncle     Cancer Father     Heart Disease Maternal Uncle        PHYSICAL EXAMINATION:    Visit Vitals    /84    Pulse (!) 53    Resp 20    Ht 6' 1\" (1.854 m)    Wt 90.7 kg (200 lb)    SpO2 99%    BMI 26.39 kg/m2     General:  Well defined, nourished, and groomed individual in no acute distress.    Neck:             Supple, nontender, no bruits, no pain with resistance to active range of motion.    Heart:            Regular rate and rhythm, no murmurs, rub, or gallop.  Normal S1S2. Lungs:  Clear to auscultation bilaterally with equal chest expansion, no cough, no wheeze  Musculoskeletal:  Extremities revealed no edema and had full range of motion of joints.    Psych:  Good mood and bright affect      NEUROLOGICAL EXAMINATION:     Mental Status:   Alert and oriented to person, place, and time with recent and remote memory intact.  Attention span and concentration are normal. Speech is fluent with a full fund of knowledge.        Cranial Nerves:    II, III, IV, VI:  Visual acuity grossly intact. Visual fields are normal.    Pupils are equal, round, and reactive to light and accommodation.    Extra-ocular movements are full and fluid.  Fundoscopic exam was benign, no ptosis or nystagmus.    V-XII:             Hearing is grossly intact.  Facial features are symmetric, with normal sensation and strength.  The palate rises symmetrically and the tongue protrudes midline.  Sternocleidomastoids 5/5.        Motor Examination:               Normal tone, bulk, and strength, 5/5 muscle strength throughout.    Coordination:  Finger to nose and rapid arm movement testing was normal.   No resting or intention tremor  Gait and Station:  Steady while walking.  Normal arm swing.  No pronator drift.   No muscle wasting or fasiculations noted.    Reflexes:  DTRs 2+ throughout.        VNS interrogated and settings are as follows: Output current: 2.0  Signal frequency: 30   Pulse width: 130  Signal on-time: 30 sec  Signal off-time: 5 min  Magnet current: 2.25   Magnet on-time: 60   Magnet pulse width: 250  AutoStim is not activated  Model AspireSR 106  Serial# [id number]  Implanted 2016-07-01  Battery is at %    ASSESSMENT AND PLAN    ICD-10-CM ICD-9-CM    1. Partial epilepsy with impairment of consciousness, intractable (HCC) G40.219 345.41 TSH AND FREE T4      VITAMIN B12 & FOLATE      LEVETIRACETAM (KEPPRA)      LACOSAMIDE      CBC WITH AUTOMATED DIFF      METABOLIC PANEL, COMPREHENSIVE      RPR   2. Status post VNS (vagus nerve stimulator) placement Z96.89 V45.89 VITAMIN B12 & FOLATE      LEVETIRACETAM (KEPPRA)      LACOSAMIDE      CBC WITH AUTOMATED DIFF      METABOLIC PANEL, COMPREHENSIVE      RPR   3. MCI (mild cognitive impairment) G31.84 331.83 TSH AND FREE T4      VITAMIN B12 & FOLATE      LEVETIRACETAM (KEPPRA)      LACOSAMIDE      CBC WITH AUTOMATED DIFF      METABOLIC PANEL, COMPREHENSIVE      RPR   4. Fatigue, unspecified type R53.83 780.79 TSH AND FREE T4      VITAMIN B12 & FOLATE      LEVETIRACETAM (KEPPRA)      LACOSAMIDE      CBC WITH AUTOMATED DIFF      METABOLIC PANEL, COMPREHENSIVE      RPR   5. Weight loss R63.4 783.21 TSH AND FREE T4      VITAMIN B12 & FOLATE      LEVETIRACETAM (KEPPRA)      LACOSAMIDE      CBC WITH AUTOMATED DIFF      METABOLIC PANEL, COMPREHENSIVE      RPR   6.  Diarrhea, unspecified type R19.7 787.91 TSH AND FREE T4      VITAMIN B12 & FOLATE      LEVETIRACETAM (KEPPRA)      LACOSAMIDE      CBC WITH AUTOMATED DIFF      METABOLIC PANEL, COMPREHENSIVE      RPR   7. MCI (mild cognitive impairment) with memory loss G31.84 331.83 TSH AND FREE T4     780.93 VITAMIN B12 & FOLATE      LEVETIRACETAM (KEPPRA)      LACOSAMIDE      CBC WITH AUTOMATED DIFF      METABOLIC PANEL, COMPREHENSIVE      RPR     Once he is off the Prozac, if the issues with diarrhea, fatigue and weight loss improve, then we will pursue alternate therapy. With regard to the memory, I would like to have him reevaluated but feel that his mood needs to be appropriately managed prior to the reassessment with neuropsychological testing. In the meantime, we will assess for any underlying causes of the fatigue, weight loss, mild cognitive impairment, to include metabolic studies, TFT, CBC, AED levels, RPR, and B12 levels. Betzaida Garcia    After discussing case with Dr. Wade Rivers, given insurance denial letter for [employer], we came up with a couple of different options if in fact the [employer] is denied:  Option 1, decrease Keppra to 1500mg tid which would half his dose, with activation of the autostim  However, given his history, this may not be enough to cover his refractory seizure disorder. Option 2, switch from [employer] to [address] [place] [address] which has the same mechanism of action and has a higher selective affinity for the SV2A protein than does Keppra, which seemed more viable than decreasing the dose of the Keppra. Either way, we would want to activate the autostim.

## 2018-06-07 NOTE — MR AVS SNAPSHOT
[address] [place] [address] [address] [place] [address] [address] [facility] [address] [phone] Patient: Romie Vides MRN: [id number] ELSA:[birth year] Visit Information Date & Time Provider Department Dept. Phone Encounter #  
 6/7/2018  2:30 PM Magaly Block NP [facility] Neurology [facility] [phone] [id number] Follow-up Instructions Return in about 3 weeks (around 6/28/2018). Upcoming Health Maintenance Date Due Hepatitis C Screening 1960 DTaP/Tdap/Td series (1 - Tdap) 9/20/1981 FOBT Q 1 YEAR AGE 50-75 9/20/2010 Influenza Age 5 to Adult 8/1/2018 Allergies as of 6/7/2018  Review Complete On: 6/7/2018 By: Magaly Block NP Severity Noted Reaction Type Reactions Tegretol [Carbamazepine]  01/12/2011    Rash Current Immunizations  Never Reviewed No immunizations on file. Not reviewed this visit You Were Diagnosed With   
  
 Codes Comments Partial epilepsy with impairment of consciousness, intractable ([facility] [address]    -  Primary ICD-10-CM: H75.516 ICD-9-CM: 345.41 Status post VNS (vagus nerve stimulator) placement     ICD-10-CM: G68.22 
ICD-9-CM: V45.89   
 MCI (mild cognitive impairment)     ICD-10-CM: G31.84 ICD-9-CM: 331.83 Fatigue, unspecified type     ICD-10-CM: R53.83 ICD-9-CM: 780.79 Weight loss     ICD-10-CM: R63.4 ICD-9-CM: 783.21 Diarrhea, unspecified type     ICD-10-CM: R19.7 ICD-9-CM: 787.91   
 MCI (mild cognitive impairment) with memory loss     ICD-10-CM: G31.84 ICD-9-CM: 331.83, 780.93 Vitals BP Pulse Resp Height(growth percentile) Weight(growth percentile) SpO2  
 138/84 (!) 53 20 6' 1\" (1.854 m) 200 lb (90.7 kg) 99% BMI Smoking Status 26.39 kg/m2 Former Smoker Vitals History BMI and BSA Data Body Mass Index Body Surface Area  
 26.39 kg/m 2 2.16 m 2 Preferred Pharmacy Pharmacy Name Phone 305 [facility], [address] [place] [address] [address] Your Updated Medication List  
  
   
This list is accurate as of 6/7/18  3:19 PM.  Always use your most recent med list.  
  
  
  
  
 donepezil 10 mg tablet Commonly known as:  ARICEPT Take 1 Tab by mouth nightly. FLUoxetine 20 mg capsule Commonly known as:  PROzac  
  
 ibuprofen 800 mg tablet Commonly known as:  MOTRIN Take 800 mg by mouth every eight (8) hours as needed for Pain. lacosamide 200 mg Tab tablet Commonly known as:  VIMPAT  
take 1 and 1/2 tablets by mouth three times a day  
  
 levETIRAcetam 750 mg tablet Commonly known as:  KEPPRA Take 4 tabs tid  
  
 tadalafil 5 mg tablet Commonly known as:  CIALIS Take 5 mg by mouth daily. We Performed the Following CBC WITH AUTOMATED DIFF [48442 CPT(R)] LACOSAMIDE [02052 CPT(R)] LEVETIRACETAM (KEPPRA) [id number] CPT(R)] METABOLIC PANEL, COMPREHENSIVE [24988 CPT(R)] RPR [40598 CPT(R)] TSH AND FREE T4 [79005 CPT(R)] VITAMIN B12 & FOLATE [97664 CPT(R)] Follow-up Instructions Return in about 3 weeks (around 6/28/2018). Patient Instructions A Healthy Lifestyle: Care Instructions Your Care Instructions A healthy lifestyle can help you feel good, stay at a healthy weight, and have plenty of energy for both work and play. A healthy lifestyle is something you can share with your whole family. A healthy lifestyle also can lower your risk for serious health problems, such as high blood pressure, heart disease, and diabetes. You can follow a few steps listed below to improve your health and the health of your family. Follow-up care is a key part of your treatment and safety. Be sure to make and go to all appointments, and call your doctor if you are having problems. It's also a good idea to know your test results and keep a list of the medicines you take. How can you care for yourself at home? · Do not eat too much sugar, fat, or fast foods. You can still have dessert and treats now and then. The goal is moderation. · Start small to improve your eating habits. Pay attention to portion sizes, drink less juice and soda pop, and eat more fruits and vegetables. ¨ Eat a healthy amount of food. A 3-ounce serving of meat, for example, is about the size of a deck of cards. Fill the rest of your plate with vegetables and whole grains. ¨ Limit the amount of soda and sports drinks you have every day. Drink more water when you are thirsty. ¨ Eat at least 5 servings of fruits and vegetables every day. It may seem like a lot, but it is not hard to reach this goal. A serving or helping is 1 piece of fruit, 1 cup of vegetables, or 2 cups of leafy, raw vegetables. Have an apple or some carrot sticks as an afternoon snack instead of a candy bar. Try to have fruits and/or vegetables at every meal. 
· Make exercise part of your daily routine. You may want to start with simple activities, such as walking, bicycling, or slow swimming. Try to be active 30 to 60 minutes every day. You do not need to do all 30 to 60 minutes all at once. For example, you can exercise 3 times a day for 10 or 20 minutes. Moderate exercise is safe for most people, but it is always a good idea to talk to your doctor before starting an exercise program. 
· Keep moving. Philip Dine the lawn, work in the garden, or [employer]!. Take the stairs instead of the elevator at work. · If you smoke, quit. People who smoke have an increased risk for heart attack, stroke, cancer, and other lung illnesses. Quitting is hard, but there are ways to boost your chance of quitting tobacco for good. ¨ Use nicotine gum, patches, or lozenges. ¨ Ask your doctor about stop-smoking programs and medicines. ¨ Keep trying.  
In addition to reducing your risk of diseases in the future, you will notice some benefits soon after you stop using tobacco. If you have shortness of breath or asthma symptoms, they will likely get better within a few weeks after you quit. · Limit how much alcohol you drink. Moderate amounts of alcohol (up to 2 drinks a day for men, 1 drink a day for women) are okay. But drinking too much can lead to liver problems, high blood pressure, and other health problems. Family health If you have a family, there are many things you can do together to improve your health. · Eat meals together as a family as often as possible. · Eat healthy foods. This includes fruits, vegetables, lean meats and dairy, and whole grains. · Include your family in your fitness plan. Most people think of activities such as jogging or tennis as the way to fitness, but there are many ways you and your family can be more active. Anything that makes you breathe hard and gets your heart pumping is exercise. Here are some tips: 
¨ Walk to do errands or to take your child to school or the bus. ¨ Go for a family bike ride after dinner instead of watching TV. Where can you learn more? Go to http://blossomHydrobeeemma.info/. Enter T302 in the search box to learn more about \"A Healthy Lifestyle: Care Instructions. \" Current as of: May 12, 2017 Content Version: 11.4 © 7447-1912 Healthwise, Incorporated. Care instructions adapted under license by [employer] (which disclaims liability or warranty for this information). If you have questions about a medical condition or this instruction, always ask your healthcare professional. [place] [address] any warranty or liability for your use of this information. Introducing [facility]! Dear Lex Alex: Thank you for requesting a [employer] account. Our records indicate that you already have an active [employer] account. You can access your account anytime at [email or web address]. [place]/[employer] Did you know that you can access your hospital and ER discharge instructions at any time in [employer]? You can also review all of your test results from your hospital stay or ER visit. Additional Information If you have questions, please visit the Frequently Asked Questions section of the [employer] website at https://[employer]. [employer]/World Review[employer]/. Remember, [employer] is NOT to be used for urgent needs. For medical emergencies, dial 911. Now available from your iPhone and Android! Please provide this summary of care documentation to your next provider. Your primary care clinician is listed as Kwasi Pradhan. If you have any questions after today's visit, please call [phone].

## 2018-06-07 NOTE — PROGRESS NOTES
No seizures since he was seen back in February   Stopped taking the buspar- about 1 month, things in his marriage were getting better so he didn't think he was needing it anymore so he didn't feel like he needed to take it anymore

## 2018-06-07 NOTE — PATIENT INSTRUCTIONS

## 2018-06-09 LAB
ALBUMIN SERPL-MCNC: 4.4 G/DL (ref 3.5–5.5)
ALBUMIN/GLOB SERPL: 2.1 {RATIO} (ref 1.2–2.2)
ALP SERPL-CCNC: 65 IU/L (ref 39–117)
ALT SERPL-CCNC: 16 IU/L (ref 0–44)
AST SERPL-CCNC: 21 IU/L (ref 0–40)
BASOPHILS # BLD AUTO: 0 X10E3/UL (ref 0–0.2)
BASOPHILS NFR BLD AUTO: 1 %
BILIRUB SERPL-MCNC: 0.3 MG/DL (ref 0–1.2)
BUN SERPL-MCNC: 13 MG/DL (ref 6–24)
BUN/CREAT SERPL: 14 (ref 9–20)
CALCIUM SERPL-MCNC: 9.2 MG/DL (ref 8.7–10.2)
CHLORIDE SERPL-SCNC: 100 MMOL/L (ref 96–106)
CO2 SERPL-SCNC: 28 MMOL/L (ref 18–29)
CREAT SERPL-MCNC: 0.94 MG/DL (ref 0.76–1.27)
EOSINOPHIL # BLD AUTO: 0.1 X10E3/UL (ref 0–0.4)
EOSINOPHIL NFR BLD AUTO: 2 %
ERYTHROCYTE [DISTWIDTH] IN BLOOD BY AUTOMATED COUNT: 13.6 % (ref 12.3–15.4)
FOLATE SERPL-MCNC: 12.6 NG/ML
GFR SERPLBLD CREATININE-BSD FMLA CKD-EPI: 104 ML/MIN/1.73
GFR SERPLBLD CREATININE-BSD FMLA CKD-EPI: 90 ML/MIN/1.73
GLOBULIN SER CALC-MCNC: 2.1 G/DL (ref 1.5–4.5)
GLUCOSE SERPL-MCNC: 88 MG/DL (ref 65–99)
HCT VFR BLD AUTO: 49 % (ref 37.5–51)
HGB BLD-MCNC: 16.4 G/DL (ref 13–17.7)
IMM GRANULOCYTES # BLD: 0 X10E3/UL (ref 0–0.1)
IMM GRANULOCYTES NFR BLD: 0 %
LACOSAMIDE SERPL-MCNC: 23.4 UG/ML (ref 5–10)
LEVETIRACETAM SERPL-MCNC: 95.6 UG/ML (ref 10–40)
LYMPHOCYTES # BLD AUTO: 1.5 X10E3/UL (ref 0.7–3.1)
LYMPHOCYTES NFR BLD AUTO: 21 %
MCH RBC QN AUTO: 30 PG (ref 26.6–33)
MCHC RBC AUTO-ENTMCNC: 33.5 G/DL (ref 31.5–35.7)
MCV RBC AUTO: 90 FL (ref 79–97)
MONOCYTES # BLD AUTO: 0.7 X10E3/UL (ref 0.1–0.9)
MONOCYTES NFR BLD AUTO: 11 %
NEUTROPHILS # BLD AUTO: 4.5 X10E3/UL (ref 1.4–7)
NEUTROPHILS NFR BLD AUTO: 65 %
PLATELET # BLD AUTO: 207 X10E3/UL (ref 150–379)
POTASSIUM SERPL-SCNC: 4.3 MMOL/L (ref 3.5–5.2)
PROT SERPL-MCNC: 6.5 G/DL (ref 6–8.5)
RBC # BLD AUTO: 5.46 X10E6/UL (ref 4.14–5.8)
RPR SER QL: NON REACTIVE
SODIUM SERPL-SCNC: 143 MMOL/L (ref 134–144)
T4 FREE SERPL-MCNC: 1.29 NG/DL (ref 0.82–1.77)
TSH SERPL DL<=0.005 MIU/L-ACNC: 1.87 UIU/ML (ref 0.45–4.5)
VIT B12 SERPL-MCNC: 498 PG/ML (ref 232–1245)
WBC # BLD AUTO: 6.8 X10E3/UL (ref 3.4–10.8)

## 2018-06-12 ENCOUNTER — TELEPHONE (OUTPATIENT)
Dept: NEUROLOGY | Age: 58
End: 2018-06-12

## 2018-06-12 NOTE — TELEPHONE ENCOUNTER
----- Message from Rufino Lemus sent at 6/11/2018  3:33 PM EDT -----  Regarding: Any Mckinley/Boubacar   Pt is requesting test results. Best contact number [phone].

## 2018-06-21 ENCOUNTER — TELEPHONE (OUTPATIENT)
Dept: NEUROLOGY | Age: 58
End: 2018-06-21

## 2018-06-21 NOTE — TELEPHONE ENCOUNTER
Patient calling again stating that insurance has not received documentation on how much keppra he takes on a daily basis. Insurance will not cover medication without that info. Informed Sindi Ferrari and she stated she had already sent it with a confirmation received. Informed patient that Sindi Ferrari will re-send information. Confirmed fax number as [phone]. Phone number for [facility] is [phone]. Patient requesting information sent as soon as possible so Keppra treatment is not disrupted.

## 2018-06-29 ENCOUNTER — TELEPHONE (OUTPATIENT)
Dept: NEUROLOGY | Age: 58
End: 2018-06-29

## 2018-06-29 NOTE — TELEPHONE ENCOUNTER
----- Message from Food Reporter sent at 6/29/2018  2:30 PM EDT -----  Regarding: Toby/Telephone  Christine Key pt's friend called inquiring if paperwork were sent to [facility] and also the results of pt blood work. Best contact number is [phone] or [phone].

## 2018-07-03 ENCOUNTER — OFFICE VISIT (OUTPATIENT)
Dept: NEUROLOGY | Age: 58
End: 2018-07-03

## 2018-07-03 VITALS
DIASTOLIC BLOOD PRESSURE: 82 MMHG | WEIGHT: 204 LBS | SYSTOLIC BLOOD PRESSURE: 126 MMHG | BODY MASS INDEX: 27.04 KG/M2 | HEART RATE: 87 BPM | HEIGHT: 73 IN | OXYGEN SATURATION: 98 % | RESPIRATION RATE: 20 BRPM

## 2018-07-03 DIAGNOSIS — G31.84 MCI (MILD COGNITIVE IMPAIRMENT): ICD-10-CM

## 2018-07-03 DIAGNOSIS — G40.219 PARTIAL EPILEPSY WITH IMPAIRMENT OF CONSCIOUSNESS, INTRACTABLE (HCC): Primary | ICD-10-CM

## 2018-07-03 DIAGNOSIS — Z96.89 STATUS POST VNS (VAGUS NERVE STIMULATOR) PLACEMENT: ICD-10-CM

## 2018-07-03 NOTE — PROGRESS NOTES
Date:  18     Name:  Rico Collins  :  1960  MRN:  [id number]     PCP:  Siomara Pritchett MD    Chief Complaint   Patient presents with    Epilepsy     HISTORY OF PRESENT ILLNESS: Follow up for epilepsy. At his last office visit, he was sent for lab work to assess for underlying causes for memory loss. This lab work was normal with exception to his AED levels which were not surprisingly elevated. He indicates that while he has not had any seizures on his present therapy, he does find that he stays pretty tired all of the time. He and his girlfriend have also noted episodes of balance issues, unsteadiness and dizziness. Sometimes, per his girlfriend, he actually will look a little high. Except as noted above, denies  fever, chills, cough. No CP or SOB. No dysuria, loss of bowel or bladder control. No Weight loss. Appetite good. Sleeping well. No sweats. No edema. No bruising or bleeding. No nausea or vomit. No diarrhea. No frequency, urgency, No depressive sxs. No anxiety. Denies sore throat, nasal congestion, nasal discharge, epistaxis, tinnitus, hearing loss, back pain, muscle pain, or joint pain. Current Outpatient Prescriptions   Medication Sig    levETIRAcetam (KEPPRA) 750 mg tablet Take 4 tabs tid    lacosamide (VIMPAT) 200 mg tab tablet take 1 and 1/2 tablets by mouth three times a day (Patient taking differently: Takes 1.5 in the morning, 1 at lunch and 2 at bedtime)    donepezil (ARICEPT) 10 mg tablet Take 1 Tab by mouth nightly.  ibuprofen (MOTRIN) 800 mg tablet Take 800 mg by mouth every eight (8) hours as needed for Pain.  tadalafil (CIALIS) 5 mg tablet Take 5 mg by mouth daily. No current facility-administered medications for this visit.       Allergies   Allergen Reactions    Tegretol [Carbamazepine] Rash     Past Medical History:   Diagnosis Date    Burning with urination     Hypertension     Seizures ([facility] [address]     Sleep apnea     Pt has machine but does not use    Unspecified sleep apnea      Past Surgical History:   Procedure Laterality Date    HX OTHER SURGICAL  2005    vagus nerve stimulator     Social History     Social History    Marital status:      Spouse name: N/A    Number of children: N/A    Years of education: N/A     Occupational History    Not on file. Social History Main Topics    Smoking status: Former Smoker     Packs/day: 1.50     Years: 20.00     Quit date: 1/27/2001    Smokeless tobacco: Former User    Alcohol use No    Drug use: No    Sexual activity: Not on file     Other Topics Concern    Not on file     Social History Narrative     Family History   Problem Relation Age of Onset    Heart Disease Maternal Uncle     Cancer Father     Heart Disease Maternal Uncle        PHYSICAL EXAMINATION:    Visit Vitals    /82    Pulse 87    Resp 20    Ht 6' 1\" (1.854 m)    Wt 92.5 kg (204 lb)    SpO2 98%    BMI 26.91 kg/m2     General:  Well defined, nourished, and groomed individual in no acute distress.    Neck:             Supple, nontender, no bruits, no pain with resistance to active range of motion.    Heart:            Regular rate and rhythm, no murmurs, rub, or gallop.  Normal S1S2. Lungs:  Clear to auscultation bilaterally with equal chest expansion, no cough, no wheeze  Musculoskeletal:  Extremities revealed no edema and had full range of motion of joints.    Psych:  Good mood and bright affect      NEUROLOGICAL EXAMINATION:     Mental Status:   Alert and oriented to person, place, and time with recent and remote memory intact.  Attention span and concentration are normal. Speech is fluent with a full fund of knowledge.        Cranial Nerves:    II, III, IV, VI:  Visual acuity grossly intact. Visual fields are normal.    Pupils are equal, round, and reactive to light and accommodation.    Extra-ocular movements are full and fluid.  Fundoscopic exam was benign, no ptosis or nystagmus. V-XII:             Hearing is grossly intact.  Facial features are symmetric, with normal sensation and strength.  The palate rises symmetrically and the tongue protrudes midline.  Sternocleidomastoids 5/5.        Motor Examination:               Normal tone, bulk, and strength, 5/5 muscle strength throughout.    Coordination:  Finger to nose and rapid arm movement testing was normal.   No resting or intention tremor  Gait and Station:  Steady while walking.  Normal arm swing.  No pronator drift.   No muscle wasting or fasiculations noted.    Reflexes:  DTRs 2+ throughout.        VNS interrogated and settings are as follows: Output current: 2.0  Signal frequency: 30   Pulse width: 130  Signal on-time: 30 sec  Signal off-time: 5 min    Activated autostim at an output current of 2.0 and pulse width at 130sec, on time at 60 sec. Heart detection at 40%  Rate detection complete and accurate      Magnet current: 2.25   Magnet on-time: 60   Magnet pulse width: 250 decreased to 130 due to cough and discomfort with magnet activation    Model AspireSR 106  Serial# [id number]  Implanted 2016-07-01  Battery is at %    ASSESSMENT AND PLAN    ICD-10-CM ICD-9-CM    1. Partial epilepsy with impairment of consciousness, intractable ([facility] [address] G40.219 345.41    2. Status post VNS (vagus nerve stimulator) placement Z96.89 V45.89    3. MCI (mild cognitive impairment) G31.84 331.83      After discussing case with Dr. Rebecca Ya following his last office visit, given insurance denial letter for [employer], we came up with a couple of different options if in fact the [employer] is denied:  Option 1, decrease Keppra to 1500mg tid which would half his dose, with activation of the autostim  However, given his history, this may not be enough to cover his refractory seizure disorder.    Option 2, switch from [employer] to [address] [place] [address] which has the same mechanism of action and has a higher selective affinity for the SV2A protein than does Keppra, which seemed more viable than decreasing the dose of the Keppra. Either way, we would want to activate the autostim. I discussed these options with him today, especially given the fact that he does have episodes in which he is off balance, dizzy, and even possibly a little high, he has decided to go with option two. Switching to a different agent and discontinuing such a high dose of the Keppra may also have some impact on his cognitive function as well. Written instructions on how to make this switch were provided in his after visit summary. The VNS was interrogated today, heart rate detection software was verified and the autostim was activated. Since he did not tolerate the magnet stimulation, the pulse width was decreased. While this was better, the stimulation was still a bit uncomfortable. As such, the autostim was set at a lower output current and pulse width. I have instructed him to use the magnet several times a day to help desensitize him to the stimulation so that when he returns in two weeks, I may be able to adjust the settings further. Betzaida Irwin

## 2018-07-03 NOTE — MR AVS SNAPSHOT
[facility] [address] [facility] [address] [id number] [phone] Patient: Leelee Ruiz MRN: [id number] XFD:[birth year] Visit Information Date & Time Provider Department Dept. Phone Encounter #  
 7/3/2018  3:00 PM Marley Ortiz NP [facility] Neurology [facility] [phone] [id number] Your Appointments 7/17/2018  8:00 AM  
Follow Up with Marley Ortiz NP [facility] ([facility]) Appt Note: 2wks f/up VNS adjustment cr  
 [address] [facility] [facility] [address] [id number] [phone]  
  
   
 [facility] [address] [address] [address] Upcoming Health Maintenance Date Due Hepatitis C Screening 1960 DTaP/Tdap/Td series (1 - Tdap) 9/20/1981 FOBT Q 1 YEAR AGE 50-75 9/20/2010 Influenza Age 5 to Adult 8/1/2018 Allergies as of 7/3/2018  Review Complete On: 7/3/2018 By: Marley Ortiz NP Severity Noted Reaction Type Reactions Tegretol [Carbamazepine]  01/12/2011    Rash Current Immunizations  Never Reviewed No immunizations on file. Not reviewed this visit You Were Diagnosed With   
  
 Codes Comments Partial epilepsy with impairment of consciousness, intractable ([facility] [address]    -  Primary ICD-10-CM: G96.345 ICD-9-CM: 345.41 Status post VNS (vagus nerve stimulator) placement     ICD-10-CM: E39.95 
ICD-9-CM: V45.89   
 MCI (mild cognitive impairment)     ICD-10-CM: G31.84 ICD-9-CM: 331.83 Vitals BP Pulse Resp Height(growth percentile) Weight(growth percentile) SpO2  
 126/82 87 20 6' 1\" (1.854 m) 204 lb (92.5 kg) 98% BMI Smoking Status 26.91 kg/m2 Former Smoker Vitals History BMI and BSA Data Body Mass Index Body Surface Area  
 26.91 kg/m 2 2.18 m 2 Preferred Pharmacy Pharmacy Name Phone 305 [facility], [address] [place] [address] [address] Your Updated Medication List  
  
   
This list is accurate as of 7/3/18  5:04 PM.  Always use your most recent med list.  
  
  
  
  
 donepezil 10 mg tablet Commonly known as:  ARICEPT Take 1 Tab by mouth nightly. ibuprofen 800 mg tablet Commonly known as:  MOTRIN Take 800 mg by mouth every eight (8) hours as needed for Pain. lacosamide 200 mg Tab tablet Commonly known as:  VIMPAT  
take 1 and 1/2 tablets by mouth three times a day  
  
 levETIRAcetam 750 mg tablet Commonly known as:  KEPPRA Take 4 tabs tid  
  
 tadalafil 5 mg tablet Commonly known as:  CIALIS Take 5 mg by mouth daily. Patient Instructions Start Briviact at 50mg bid Take off one 750mg dose in the morning x1 week Then increase to 100mg bid of Briviact Then decrease by another 750mg in the morning and 750mg at noon x 1 week Then for each week after this, drop one 750mg dose of the Keppra each week until off Introducing [facility]! Dear Angelo Castillo: Thank you for requesting a [employer] account. Our records indicate that you already have an active [employer] account. You can access your account anytime at [email or web address]://[employer]. [employer] Did you know that you can access your hospital and ER discharge instructions at any time in [employer]? You can also review all of your test results from your hospital stay or ER visit. Additional Information If you have questions, please visit the Frequently Asked Questions section of the [employer] website at [email or web address]://[employer]. [employer]/. Remember, [employer] is NOT to be used for urgent needs. For medical emergencies, dial 911. Now available from your iPhone and Android! Please provide this summary of care documentation to your next provider. Your primary care clinician is listed as [place].  If you have any questions after today's visit, please call [phone].

## 2018-07-03 NOTE — TELEPHONE ENCOUNTER
I do not recall having paper work for [facility] but then that was a month ago. His labs were normal except for the drug levels which are high. I expected that given the doses of his medication per NP. Patient was in today for his f/up appt and was given this information at his visit.

## 2018-07-03 NOTE — PATIENT INSTRUCTIONS
Start Briviact at 50mg bid  Take off one 750mg dose in the morning x1 week    Then increase to 100mg bid of Briviact  Then decrease by another 750mg in the morning and 750mg at noon x 1 week    Then for each week after this, drop one 750mg dose of the Keppra each week until off

## 2018-07-06 ENCOUNTER — TELEPHONE (OUTPATIENT)
Dept: NEUROLOGY | Age: 58
End: 2018-07-06

## 2018-07-06 NOTE — TELEPHONE ENCOUNTER
Received [employer] [place] 2nd appeal denial letter regarding Keppra QOR 9000 mg ( nine thousand )  per day ( 4 tabs @750 mg x three times a day)   Reason:Claim dollar exception has not been meet . There is no support for the requested dosage in the FDA approved labeling or compendia .     [place] 2nd [place]  case closed     Clinical staff has been informed of this matter

## 2018-08-03 DIAGNOSIS — G40.219 PARTIAL EPILEPSY WITH IMPAIRMENT OF CONSCIOUSNESS, INTRACTABLE (HCC): ICD-10-CM

## 2018-08-06 RX ORDER — ALPRAZOLAM 0.25 MG/1
TABLET ORAL
Qty: 60 TAB | Refills: 1 | OUTPATIENT
Start: 2018-08-06

## 2018-08-06 RX ORDER — LACOSAMIDE 200 MG/1
TABLET ORAL
Qty: 405 TAB | Refills: 1 | Status: SHIPPED | OUTPATIENT
Start: 2018-08-06 | End: 2018-11-16 | Stop reason: SDUPTHER

## 2018-09-11 DIAGNOSIS — G40.219 PARTIAL EPILEPSY WITH IMPAIRMENT OF CONSCIOUSNESS, INTRACTABLE (HCC): ICD-10-CM

## 2018-09-11 RX ORDER — ALPRAZOLAM 0.25 MG/1
TABLET ORAL
Qty: 60 TAB | Refills: 1 | Status: SHIPPED | OUTPATIENT
Start: 2018-09-11 | End: 2018-09-12 | Stop reason: SDUPTHER

## 2018-09-12 RX ORDER — ALPRAZOLAM 0.25 MG/1
TABLET ORAL
Qty: 180 TAB | Refills: 0 | Status: SHIPPED | OUTPATIENT
Start: 2018-09-12 | End: 2018-11-16 | Stop reason: SDUPTHER

## 2018-09-12 NOTE — TELEPHONE ENCOUNTER
----- Message from Jagruti Wilkinson sent at 9/12/2018 10:31 AM EDT -----  Regarding: Dr. Lobito Johnson delivered requesting verbal authorization for medication \"Xanax\" 0.25 MG.   Best contact number [phone]   reference number: [id number]

## 2018-10-02 ENCOUNTER — TELEPHONE (OUTPATIENT)
Dept: NEUROLOGY | Age: 58
End: 2018-10-02

## 2018-10-02 ENCOUNTER — OFFICE VISIT (OUTPATIENT)
Dept: NEUROLOGY | Age: 58
End: 2018-10-02

## 2018-10-02 VITALS
SYSTOLIC BLOOD PRESSURE: 138 MMHG | BODY MASS INDEX: 27.04 KG/M2 | WEIGHT: 204 LBS | HEIGHT: 73 IN | DIASTOLIC BLOOD PRESSURE: 84 MMHG | RESPIRATION RATE: 20 BRPM

## 2018-10-02 DIAGNOSIS — G40.219 PARTIAL EPILEPSY WITH IMPAIRMENT OF CONSCIOUSNESS, INTRACTABLE (HCC): ICD-10-CM

## 2018-10-02 NOTE — MR AVS SNAPSHOT
Melissa Vazquez 
 
 
 [facility] [address] [facility] [address] [facility] [place] [address] [phone] Patient: Liv Marroquin MRN: [id number] Q:4[birth year] Visit Information Date & Time Provider Department Dept. Phone Encounter #  
 10/2/2018 10:00 AM Cherylene Pick, NP [facility] [phone] [id number] Upcoming Health Maintenance Date Due Hepatitis C Screening 1960 DTaP/Tdap/Td series (1 - Tdap) 9/20/1981 Shingrix Vaccine Age 50> (1 of 2) 9/20/2010 FOBT Q 1 YEAR AGE 50-75 9/20/2010 Influenza Age 5 to Adult 8/1/2018 Allergies as of 10/2/2018  Review Complete On: 10/2/2018 By: Cherylene Pick, NP Severity Noted Reaction Type Reactions Tegretol [Carbamazepine]  01/12/2011    Rash Current Immunizations  Never Reviewed No immunizations on file. Not reviewed this visit You Were Diagnosed With   
  
 Codes Comments Partial epilepsy with impairment of consciousness, intractable ([facility]     ICD-10-CM: M02.673 ICD-9-CM: 345.41 Vitals BP Resp Height(growth percentile) Weight(growth percentile) BMI Smoking Status 138/84 20 6' 1\" (1.854 m) 204 lb (92.5 kg) 26.91 kg/m2 Former Smoker Vitals History BMI and BSA Data Body Mass Index Body Surface Area  
 26.91 kg/m 2 2.18 m 2 Preferred Pharmacy Pharmacy Name Phone [facility], [address] [place] [address] [address] [place] [address] Your Updated Medication List  
  
   
This list is accurate as of 10/2/18 10:13 AM.  Always use your most recent med list.  
  
  
  
  
 ALPRAZolam 0.25 mg tablet Commonly known as:  XANAX  
1 po q12 hours prn anxiety--may cause drowsiness * brivaracetam 100 mg tablet Commonly known as:  BRIVIACT Take 1 Tab by mouth two (2) times a day. Max Daily Amount: 200 mg.  
  
 * brivaracetam 50 mg tablet Commonly known as:  BRIVIACT Take one 50mg dose twice a day with 100mg twice a day for 150mg bid  
  
 donepezil 10 mg tablet Commonly known as:  ARICEPT Take 1 Tab by mouth nightly. ibuprofen 800 mg tablet Commonly known as:  MOTRIN Take 800 mg by mouth every eight (8) hours as needed for Pain. lacosamide 200 mg Tab tablet Commonly known as:  VIMPAT Take 1.5 tablets QAM, 1 midday, and 2 at bedtime  
  
 tadalafil 5 mg tablet Commonly known as:  CIALIS Take 5 mg by mouth daily. * Notice: This list has 2 medication(s) that are the same as other medications prescribed for you. Read the directions carefully, and ask your doctor or other care provider to review them with you. Prescriptions Printed Refills  
 brivaracetam (BRIVIACT) 100 mg tablet 3 Sig: Take 1 Tab by mouth two (2) times a day. Max Daily Amount: 200 mg. Class: Print Route: Oral  
 brivaracetam (BRIVIACT) 50 mg tablet 3 Sig: Take one 50mg dose twice a day with 100mg twice a day for 150mg bid Class: Print We Performed the Following NM ELEC BAYRON NSTIM PLS GEN SMPL SC/PERPH W/PRGRMG [61143 CPT(R)] Introducing [facility] SERVICES! Dear Gorge Clarke: Thank you for requesting a Zimride account. Our records indicate that you already have an active Zimride account. You can access your account anytime at [email or web address] Did you know that you can access your hospital and ER discharge instructions at any time in Zimride? You can also review all of your test results from your hospital stay or ER visit. Additional Information If you have questions, please visit the Frequently Asked Questions section of the Zimride website at [email or web address] Remember, Zimride is NOT to be used for urgent needs. For medical emergencies, dial 911. Now available from your iPhone and Android! Please provide this summary of care documentation to your next provider. Your primary care clinician is listed as Matias Cespedes. If you have any questions after today's visit, please call [phone].

## 2018-10-02 NOTE — PROGRESS NOTES
Has been completely off Keppra for about 3 weeks, he did really good with the transition he had 2 spells that were pretty minor and they were about 3 weeks a part until the recent ones that were about 3 days a part   2 spells, pretty major episodes in the last week, Sunday afternoon he had one where he was just sitting on the couch and it was like a light bulb that went off he stated he had to go somewhere but couldn't tell them where that was, he sat back down on the couch his hands were trembling and he got emotional   Last night around 7 he walked into the kitchen, completely disoriented to time, place, where they live it looked like he was coming out of it, then he started right back to being confused again   He did have a headache during the night, he was confused for most of the ride and they have about a 2 hour ride to the office   He did state he could feel his VNS device activating more frequent in the last day or so

## 2018-10-02 NOTE — PROGRESS NOTES
Date:  10/02/18     Name:  Cecy Briseno  :  1960  MRN:  [id number]     PCP:  Chun Lozada MD    Chief Complaint   Patient presents with    Follow-up     seizure       HISTORY OF PRESENT ILLNESS: Follow up for epilepsy. Had two small seizures when he was making the transition from [employer] to [address] [place] [address]. He is no longer taking the Keppra at this point. He has been completely weaned from the [employer] for right at three weeks now. No seizures from that point until the last week. He is now taking Vimpat 300mg twice a day and Briviact 100mg twice a day. In the last week, he has had two more severe episodes. Girlfriend gave him one of his Xanax and this seemed to help calm down the shaking and and emotional. The episode he had last night he was very confused and could not tell her where he was or answer questions about orientation. Girlfriend indicates that he could have a logical conversation about other things though. He did not seem normal until shortly before getting here this morning. He did have a headache after his seizure last night. There has been a lot of stress related to property settlements and his divorce. Except as noted above, denies  fever, chills, cough. No CP or SOB. No dysuria, loss of bowel or bladder control. No Weight loss. Appetite good. Sleeping well. No sweats. No edema. No bruising or bleeding. No nausea or vomit. No diarrhea. No frequency, urgency, No depressive sxs. No anxiety. Denies sore throat, nasal congestion, nasal discharge, epistaxis, tinnitus, hearing loss, back pain, muscle pain, or joint pain. Current Outpatient Prescriptions   Medication Sig    ALPRAZolam (XANAX) 0.25 mg tablet 1 po q12 hours prn anxiety--may cause drowsiness    lacosamide (VIMPAT) 200 mg tab tablet Take 1.5 tablets QAM, 1 midday, and 2 at bedtime    brivaracetam (BRIVIACT) 100 mg tablet Take 1 Tab by mouth two (2) times a day.  Max Daily Amount: 200 mg.   [facility] donepezil (ARICEPT) 10 mg tablet Take 1 Tab by mouth nightly.  ibuprofen (MOTRIN) 800 mg tablet Take 800 mg by mouth every eight (8) hours as needed for Pain.  tadalafil (CIALIS) 5 mg tablet Take 5 mg by mouth daily. No current facility-administered medications for this visit. Allergies   Allergen Reactions    Tegretol [Carbamazepine] Rash     Past Medical History:   Diagnosis Date    Burning with urination     Hypertension     Seizures ([address]     Sleep apnea     Pt has machine but does not use    Unspecified sleep apnea      Past Surgical History:   Procedure Laterality Date    HX OTHER SURGICAL  2005    vagus nerve stimulator     Social History     Social History    Marital status:      Spouse name: N/A    Number of children: N/A    Years of education: N/A     Occupational History    Not on file. Social History Main Topics    Smoking status: Former Smoker     Packs/day: 1.50     Years: 20.00     Quit date: 1/27/2001    Smokeless tobacco: Former User    Alcohol use No    Drug use: No    Sexual activity: Not on file     Other Topics Concern    Not on file     Social History Narrative     Family History   Problem Relation Age of Onset    Heart Disease Maternal Uncle     Cancer Father     Heart Disease Maternal Uncle        PHYSICAL EXAMINATION:    Visit Vitals    /84    Resp 20    Ht 6' 1\" (1.854 m)    Wt 92.5 kg (204 lb)    BMI 26.91 kg/m2     General:  Well defined, nourished, and groomed individual in no acute distress.    Neck:             Supple, nontender, no bruits, no pain with resistance to active range of motion.    Heart:            Regular rate and rhythm, no murmurs, rub, or gallop.  Normal S1S2.   Lungs:  Clear to auscultation bilaterally with equal chest expansion, no cough, no wheeze  Musculoskeletal:  Extremities revealed no edema and had full range of motion of joints.    Psych:  Good mood and bright affect      NEUROLOGICAL EXAMINATION:     Mental Status:   Alert and oriented to person, place, and time with recent and remote memory intact.  Attention span and concentration are normal. Speech is fluent with a full fund of knowledge.        Cranial Nerves:    II, III, IV, VI:  Visual acuity grossly intact. Visual fields are normal.    Pupils are equal, round, and reactive to light and accommodation.    Extra-ocular movements are full and fluid.  Fundoscopic exam was benign, no ptosis or nystagmus. V-XII:             Hearing is grossly intact.  Facial features are symmetric, with normal sensation and strength.  The palate rises symmetrically and the tongue protrudes midline.  Sternocleidomastoids 5/5.        Motor Examination:               Normal tone, bulk, and strength, 5/5 muscle strength throughout.    Coordination:  Finger to nose and rapid arm movement testing was normal.   No resting or intention tremor  Gait and Station:  Steady while walking.  Normal arm swing.  No pronator drift.   No muscle wasting or fasiculations noted.    Reflexes:  DTRs 2+ throughout.        VNS interrogated and settings are as follows: Output current: 2.0 increased to 2.25  Signal frequency: 30   Pulse width: 130  Signal on-time: 30 sec  Signal off-time: 5 min     Autostim   Output current  2.0 increased to 2.25  Pulse width at 130sec  On time  60 sec. Heart detection at 40%     Magnet current: 2.25 increased to 2.5  Magnet on-time: 60   Magnet pulse width: 130      Model AspireSR 106  Serial# [id number]  Implanted 2016-07-01  Battery is at %       ASSESSMENT AND PLAN    ICD-10-CM ICD-9-CM    1. Partial epilepsy with impairment of consciousness, intractable ([facility]) G40.219 345.41 brivaracetam (BRIVIACT) 100 mg tablet      brivaracetam (BRIVIACT) 50 mg tablet      NE ELEC BAYRON NSTIM PLS GEN SMPL SC/PERPH W/PRGRMG     Breakthrough seizure without any obvious inciting factor.  Looking at the VNS device history for the time frame when he had his seizures recently demonstrates a lot of autostim activation, all day but more during the night. Discussed treatment at this point and will plan to increase the Briviact to 150mg twice a day. He will continue with the Vimpat 300mg twice a day. VNS was adjusted as above. We could also try to decrease the off time for the normal settings if the he continues to have some breakthrough seizure. Luckily, he has actually been doing fairly well despite the recent breakthrough seizures. Since he has been off of the [address], he is much clearer headed and has a lot more energy. Follow up in three months  [address] [facility].  Nazanin Addison

## 2018-10-16 RX ORDER — FLUOXETINE HYDROCHLORIDE 20 MG/1
20 CAPSULE ORAL DAILY
Qty: 90 CAP | Refills: 0 | Status: SHIPPED | OUTPATIENT
Start: 2018-10-16 | End: 2018-11-05 | Stop reason: SDUPTHER

## 2018-10-19 DIAGNOSIS — G40.219 PARTIAL EPILEPSY WITH IMPAIRMENT OF CONSCIOUSNESS, INTRACTABLE (HCC): ICD-10-CM

## 2018-11-06 RX ORDER — FLUOXETINE HYDROCHLORIDE 20 MG/1
CAPSULE ORAL
Qty: 90 CAP | Refills: 0 | Status: SHIPPED | OUTPATIENT
Start: 2018-11-06 | End: 2019-01-25 | Stop reason: SDUPTHER

## 2018-11-13 DIAGNOSIS — G40.219 PARTIAL EPILEPSY WITH IMPAIRMENT OF CONSCIOUSNESS, INTRACTABLE (HCC): ICD-10-CM

## 2018-11-13 NOTE — TELEPHONE ENCOUNTER
----- Message from Alyx Sharif sent at 11/13/2018 11:44 AM EST -----  Regarding: YULISSA Mckinley/Boubacar Orourke with [address], calling regarding pt's medications \"Alprazolam\" 025 mg and \"Vimpat\" 200 mg [phone] also can be  faxed  to [phone]. Ref num [id number].

## 2018-11-19 RX ORDER — LACOSAMIDE 200 MG/1
TABLET ORAL
Qty: 405 TAB | Refills: 1 | Status: SHIPPED | OUTPATIENT
Start: 2018-11-19 | End: 2019-01-02 | Stop reason: SDUPTHER

## 2018-11-19 RX ORDER — ALPRAZOLAM 0.25 MG/1
TABLET ORAL
Qty: 180 TAB | Refills: 0 | Status: SHIPPED | OUTPATIENT
Start: 2018-11-19 | End: 2019-06-06 | Stop reason: SDUPTHER

## 2019-01-02 ENCOUNTER — OFFICE VISIT (OUTPATIENT)
Dept: NEUROLOGY | Age: 59
End: 2019-01-02

## 2019-01-02 VITALS
SYSTOLIC BLOOD PRESSURE: 132 MMHG | OXYGEN SATURATION: 97 % | RESPIRATION RATE: 20 BRPM | DIASTOLIC BLOOD PRESSURE: 86 MMHG | HEIGHT: 73 IN | BODY MASS INDEX: 27.04 KG/M2 | WEIGHT: 204 LBS | HEART RATE: 60 BPM

## 2019-01-02 DIAGNOSIS — G31.84 MCI (MILD COGNITIVE IMPAIRMENT): ICD-10-CM

## 2019-01-02 DIAGNOSIS — G40.219 PARTIAL EPILEPSY WITH IMPAIRMENT OF CONSCIOUSNESS, INTRACTABLE (HCC): Primary | ICD-10-CM

## 2019-01-02 RX ORDER — LACOSAMIDE 200 MG/1
TABLET ORAL
Qty: 405 TAB | Refills: 1 | Status: SHIPPED | OUTPATIENT
Start: 2019-01-02 | End: 2019-02-26 | Stop reason: SDUPTHER

## 2019-01-02 NOTE — PROGRESS NOTES
Date:  19     Name:  Patrice Gilmore  :  1960  MRN:  [id number]     PCP:  Rudi Arizmendi MD    Chief Complaint   Patient presents with    Follow-up     epilepsy/dementia        HISTORY OF PRESENT ILLNESS: Follow up for epilepsy. He is taking 150mg of the Briviact twice a day and Vimpat 300mg bid. No additional breakthrough seizures since last visit. He indicates that the Aricept seems to help him think more clearly though he is not really sure if he needs it. He did have neuropsychological evaluation in 2017. It was suggested that he have reassessment in six months but this was not scheduled. Except as noted above, denies  fever, chills, cough. No CP or SOB. No dysuria, loss of bowel or bladder control. No Weight loss. Appetite good. Sleeping well. No sweats. No edema. No bruising or bleeding. No nausea or vomit. No diarrhea. No frequency, urgency, No depressive sxs. No anxiety. Denies sore throat, nasal congestion, nasal discharge, epistaxis, tinnitus, hearing loss, back pain, muscle pain, or joint pain. Current Outpatient Medications   Medication Sig    lacosamide (VIMPAT) 200 mg tab tablet Take 1.5 tablets QAM, 1 midday, and 2 at bedtime    ALPRAZolam (XANAX) 0.25 mg tablet 1 po q12 hours prn anxiety--may cause drowsiness    FLUoxetine (PROZAC) 20 mg capsule TAKE 1 CAPSULE BY MOUTH  DAILY    brivaracetam (BRIVIACT) 100 mg tablet Take 1 Tab by mouth two (2) times a day. Max Daily Amount: 200 mg.    donepezil (ARICEPT) 10 mg tablet Take 1 Tab by mouth nightly.  ibuprofen (MOTRIN) 800 mg tablet Take 800 mg by mouth every eight (8) hours as needed for Pain.  tadalafil (CIALIS) 5 mg tablet Take 5 mg by mouth daily. No current facility-administered medications for this visit.       Allergies   Allergen Reactions    Tegretol [Carbamazepine] Rash     Past Medical History:   Diagnosis Date    Burning with urination     Hypertension     Seizures ([facility]     Sleep apnea     Pt has machine but does not use    Unspecified sleep apnea      Past Surgical History:   Procedure Laterality Date    HX OTHER SURGICAL  2005    vagus nerve stimulator     Social History     Socioeconomic History    Marital status:      Spouse name: Not on file    Number of children: Not on file    Years of education: Not on file    Highest education level: Not on file   Social Needs    Financial resource strain: Not on file    Food insecurity - worry: Not on file    Food insecurity - inability: Not on file    Transportation needs - medical: Not on file   [employer] needs - non-medical: Not on file   Occupational History    Not on file   Tobacco Use    Smoking status: Former Smoker     Packs/day: 1.50     Years: 20.00     Pack years: 30.00     Last attempt to quit: 2001     Years since quittin.9    Smokeless tobacco: Former User   Substance and Sexual Activity    Alcohol use: No    Drug use: No    Sexual activity: Not on file   Other Topics Concern    Not on file   Social History Narrative    Not on file     Family History   Problem Relation Age of Onset    Heart Disease Maternal Uncle     Cancer Father     Heart Disease Maternal Uncle        PHYSICAL EXAMINATION:    Visit Vitals  /86   Pulse 60   Resp 20   Ht 6' 1\" (1.854 m)   Wt 92.5 kg (204 lb)   SpO2 97%   BMI 26.91 kg/m²     General:  Well defined, nourished, and groomed individual in no acute distress.    Neck:             Supple, nontender, no bruits, no pain with resistance to active range of motion.    Heart:            Regular rate and rhythm, no murmurs, rub, or gallop.  Normal S1S2.   Lungs:  Clear to auscultation bilaterally with equal chest expansion, no cough, no wheeze  Musculoskeletal:  Extremities revealed no edema and had full range of motion of joints.    Psych:  Good mood and bright affect      NEUROLOGICAL EXAMINATION:     Mental Status:   Alert and oriented to person, place, and time with recent and remote memory intact.  Attention span and concentration are normal. Speech is fluent with a full fund of knowledge.        Cranial Nerves:    II, III, IV, VI:  Visual acuity grossly intact. Visual fields are normal.    Pupils are equal, round, and reactive to light and accommodation.    Extra-ocular movements are full and fluid.  Fundoscopic exam was benign, no ptosis or nystagmus. V-XII:             Hearing is grossly intact.  Facial features are symmetric, with normal sensation and strength.  The palate rises symmetrically and the tongue protrudes midline.  Sternocleidomastoids 5/5.        Motor Examination:               Normal tone, bulk, and strength, 5/5 muscle strength throughout.    Coordination:  Finger to nose and rapid arm movement testing was normal.   No resting or intention tremor  Gait and Station:  Steady while walking.  Normal arm swing.  No pronator drift.   No muscle wasting or fasiculations noted.    Reflexes:  DTRs 2+ throughout.        VNS interrogated and settings are as follows: Output current: 2.25  Signal frequency: 30   Pulse width: 130  Signal on-time: 30 sec  Signal off-time: 5 min     Autostim   Output current  2.25 increased to 2.5  Pulse width at 130sec  On time  60 sec. Heart detection at 40% was decreased to 20%     Magnet current: 2.5 increased to 2.75  Magnet on-time: 60   Magnet pulse width: 130      Model AspireSR 106  Serial# [id number]  Implanted 2016-07-01  Battery is at %     ASSESSMENT AND PLAN    ICD-10-CM ICD-9-CM    1. Partial epilepsy with impairment of consciousness, intractable ([facility]) G40.219 345.41 lacosamide (VIMPAT) 200 mg tab tablet      brivaracetam (BRIVIACT) 100 mg tablet      brivaracetam (BRIVIACT) 50 mg tablet      DE COMPLEX CRANIAL NEUROSTIM,1ST HOUR   2.  MCI (mild cognitive impairment) G31.84 331.83 REFERRAL TO NEUROPSYCHOLOGY     Epilepsy is stable on present therapy of Briviact and Vimpat in combination with the VNS. He will continue with the Briviact and Vimpat as previously prescribed. VNS was interrogated and adjusted as documented. Mild cognitive impairment by neuropsychological testing now on Aricept which he does think has helped him think more clearly. It was recommended this be reassessed in six months at that time but was not scheduled. I would be interested to see where he is cognitively now that he is not on the high doses of Keppra. This was ordered today. Follow up in six months. Betzaida Morales

## 2019-01-02 NOTE — PATIENT INSTRUCTIONS
A Healthy Lifestyle: Care Instructions  Your Care Instructions    A healthy lifestyle can help you feel good, stay at a healthy weight, and have plenty of energy for both work and play. A healthy lifestyle is something you can share with your whole family. A healthy lifestyle also can lower your risk for serious health problems, such as high blood pressure, heart disease, and diabetes. You can follow a few steps listed below to improve your health and the health of your family. Follow-up care is a key part of your treatment and safety. Be sure to make and go to all appointments, and call your doctor if you are having problems. It's also a good idea to know your test results and keep a list of the medicines you take. How can you care for yourself at home? · Do not eat too much sugar, fat, or fast foods. You can still have dessert and treats now and then. The goal is moderation. · Start small to improve your eating habits. Pay attention to portion sizes, drink less juice and soda pop, and eat more fruits and vegetables. ? Eat a healthy amount of food. A 3-ounce serving of meat, for example, is about the size of a deck of cards. Fill the rest of your plate with vegetables and whole grains. ? Limit the amount of soda and sports drinks you have every day. Drink more water when you are thirsty. ? Eat at least 5 servings of fruits and vegetables every day. It may seem like a lot, but it is not hard to reach this goal. A serving or helping is 1 piece of fruit, 1 cup of vegetables, or 2 cups of leafy, raw vegetables. Have an apple or some carrot sticks as an afternoon snack instead of a candy bar. Try to have fruits and/or vegetables at every meal.  · Make exercise part of your daily routine. You may want to start with simple activities, such as walking, bicycling, or slow swimming. Try to be active 30 to 60 minutes every day. You do not need to do all 30 to 60 minutes all at once.  For example, you can exercise 3 times a day for 10 or 20 minutes. Moderate exercise is safe for most people, but it is always a good idea to talk to your doctor before starting an exercise program.  · Keep moving. Abdirahman Wilkinson the lawn, work in the garden, or [employer]. Take the stairs instead of the elevator at work. · If you smoke, quit. People who smoke have an increased risk for heart attack, stroke, cancer, and other lung illnesses. Quitting is hard, but there are ways to boost your chance of quitting tobacco for good. ? Use nicotine gum, patches, or lozenges. ? Ask your doctor about stop-smoking programs and medicines. ? Keep trying. In addition to reducing your risk of diseases in the future, you will notice some benefits soon after you stop using tobacco. If you have shortness of breath or asthma symptoms, they will likely get better within a few weeks after you quit. · Limit how much alcohol you drink. Moderate amounts of alcohol (up to 2 drinks a day for men, 1 drink a day for women) are okay. But drinking too much can lead to liver problems, high blood pressure, and other health problems. Family health  If you have a family, there are many things you can do together to improve your health. · Eat meals together as a family as often as possible. · Eat healthy foods. This includes fruits, vegetables, lean meats and dairy, and whole grains. · Include your family in your fitness plan. Most people think of activities such as jogging or tennis as the way to fitness, but there are many ways you and your family can be more active. Anything that makes you breathe hard and gets your heart pumping is exercise. Here are some tips:  ? Walk to do errands or to take your child to school or the bus.  ? Go for a family bike ride after dinner instead of watching TV. Where can you learn more? Go to http://blossom-emma.info/. Enter U708 in the search box to learn more about \"A Healthy Lifestyle: Care Instructions. \"  Current as of: December 7, 2017  Content Version: 11.8  © 8780-6535 Healthwise, Incorporated. Care instructions adapted under license by [employer] (which disclaims liability or warranty for this information). If you have questions about a medical condition or this instruction, always ask your healthcare professional. [address] any warranty or liability for your use of this information.

## 2019-01-07 ENCOUNTER — TELEPHONE (OUTPATIENT)
Dept: NEUROLOGY | Age: 59
End: 2019-01-07

## 2019-01-14 ENCOUNTER — TELEPHONE (OUTPATIENT)
Dept: NEUROLOGY | Age: 59
End: 2019-01-14

## 2019-01-14 DIAGNOSIS — G40.219 PARTIAL EPILEPSY WITH IMPAIRMENT OF CONSCIOUSNESS, INTRACTABLE (HCC): ICD-10-CM

## 2019-01-14 NOTE — TELEPHONE ENCOUNTER
Received call from Pt regarding RX of Briviact. He was asking to see if RX can be given at a 90 day supply since it will make his deductible cheaper. He was also just checking on the status of RX as he is running low.  Best contact [phone]

## 2019-01-14 NOTE — TELEPHONE ENCOUNTER
----- Message from Tyree Leo sent at 1/14/2019 12:30 PM EST -----  Regarding: YULISSA Kumar/Telephone  Pt is calling to speak with nurse regarding refill for \"brivaracetam\" 100 mg and the other \"brivaracetam\"  was 50 mg tab medication. It's for 90 day supply mail order. Pt's contact [phone].

## 2019-01-15 NOTE — TELEPHONE ENCOUNTER
R/t call to patient. He states that he needs a 90 day supply of briviact 100 and briviact 50 [facility] to [facility]. 30 day supply were previously sent. [facility]axed [facility] to [facility]  Confirmation received.

## 2019-01-15 NOTE — TELEPHONE ENCOUNTER
----- Message from Acacia Strickland sent at 1/14/2019  3:51 PM EST -----  Regarding: YULISSA Mckinley/rx refill  Pt's friend Georgia Kirkpatrick and is on his consent  to call list  [phone]. Said during his last visit on 1/2/19, he was told a rx for his Briviact, his seizure medication was going to be called in during that visit into [address] (p) [phone],  And when he contacted the pharmacy they were told they have not received anything yet, he is runnning low, and will need a 90 day rx called in as soon as possible.

## 2019-01-28 RX ORDER — FLUOXETINE HYDROCHLORIDE 20 MG/1
CAPSULE ORAL
Qty: 90 CAP | Refills: 0 | Status: SHIPPED | OUTPATIENT
Start: 2019-01-28 | End: 2019-03-17 | Stop reason: SDUPTHER

## 2019-02-18 ENCOUNTER — TELEPHONE (OUTPATIENT)
Dept: NEUROLOGY | Age: 59
End: 2019-02-18

## 2019-02-19 NOTE — TELEPHONE ENCOUNTER
Received PA approval for VIMPAT 200mg. Letter states approval # is [id number] from 08/14/18-08/14/19. Not sure why a PA request was sent. Scanned approval notice to chart if needed later.

## 2019-02-20 NOTE — TELEPHONE ENCOUNTER
Received fax form [employer] stating that Vimpat quantity (4.5 tablets daily) is DENIED. Daily tablet amount exceeds dollar amount covered by insurance.  Will submit appeal for limit override for patient's medication

## 2019-02-22 ENCOUNTER — TELEPHONE (OUTPATIENT)
Dept: NEUROLOGY | Age: 59
End: 2019-02-22

## 2019-02-22 DIAGNOSIS — R06.83 SNORING: Primary | ICD-10-CM

## 2019-02-22 NOTE — TELEPHONE ENCOUNTER
----- Message from Fariha Pina sent at 2/22/2019  1:11 PM EST -----  Regarding: Alessandra Davis NP/Telephone  The patient's friend Jayant Pathak is requesting a call back from the NP to discuss the patient having a sleep study done.  ([phone]

## 2019-02-25 NOTE — TELEPHONE ENCOUNTER
Pt's girlfriend is asking if he can be tested for sleep apnea and how we go about doing that or setting him up for that. Wasn't sure if he had to have an appt for that, needed to be ordered by his PCP or if the sleep doctor would even need a referral?  Please advise     Sleep study referral placed and faxed ot the office. Called the friend back to let her know what the address is and the phone number to call and schedule him an appointment.

## 2019-02-26 DIAGNOSIS — G40.219 PARTIAL EPILEPSY WITH IMPAIRMENT OF CONSCIOUSNESS, INTRACTABLE (HCC): ICD-10-CM

## 2019-02-28 RX ORDER — LACOSAMIDE 200 MG/1
TABLET ORAL
Qty: 405 TAB | Refills: 1 | Status: SHIPPED | OUTPATIENT
Start: 2019-02-28 | End: 2019-07-10 | Stop reason: SDUPTHER

## 2019-03-04 ENCOUNTER — TELEPHONE (OUTPATIENT)
Dept: NEUROLOGY | Age: 59
End: 2019-03-04

## 2019-03-04 NOTE — TELEPHONE ENCOUNTER
Pt called bc he is having trouble getting his VIMPAT delivered. They said they need to speak with provider or nurse to verify pt is not using medication for pain but for seizures. He is running low and wanted to let you know so we can start the delivery process.  Best contact [phone]

## 2019-03-05 ENCOUNTER — OFFICE VISIT (OUTPATIENT)
Dept: SLEEP MEDICINE | Age: 59
End: 2019-03-05

## 2019-03-05 VITALS
HEART RATE: 67 BPM | SYSTOLIC BLOOD PRESSURE: 135 MMHG | DIASTOLIC BLOOD PRESSURE: 67 MMHG | BODY MASS INDEX: 28.23 KG/M2 | OXYGEN SATURATION: 95 % | WEIGHT: 213 LBS | HEIGHT: 73 IN | RESPIRATION RATE: 18 BRPM

## 2019-03-05 DIAGNOSIS — G47.33 OBSTRUCTIVE SLEEP APNEA (ADULT) (PEDIATRIC): Primary | ICD-10-CM

## 2019-03-05 NOTE — PROGRESS NOTES
217 [facility] [place]. [place], [address]  Tel.  [phone]  Fax. 100 [facility] 60  [place], [address] [facility]  Tel.  [phone]  Fax. [phone] [address]   Tel.  [phone]  Fax. [phone]         Subjective:      Diego Lnin is an 62 y.o. male referred for evaluation for a sleep disorder. He complains of snoring, snorting, periods of not breathing associated with excessive daytime sleepiness. Symptoms began many years ago, gradually worsening since that time. He usually can fall asleep in variable but now just a few minutes. Family or house members note snoring, periods of not breathing. He denies falling asleep while at work, driving. Diego Linn does wake up frequently at night. He is bothered by waking up too early and left unable to get back to sleep. He actually sleeps about 5 hours at night and wakes up about 6 times during the night. He   work shifts: Last 2 Left indicates he does get too little sleep at night. His bedtime is 2230. He awakens at 0500. He does not take naps. . He has the following observed behaviors: Loud snoring, Pauses in breathing, Sitting up in bed while still asleep, Twitching of legs or feet, Grinding teeth, Kicking with legs;  . Other remarks:    Diagnosed with sleep apnea 25 years ago but did not tolerate CPAP. His girlfriend says he stops breathing in his sleep  Seizures well controlled. [place] Sleepiness Score: 16   which reflect moderate daytime drowsiness. Allergies   Allergen Reactions    Tegretol [Carbamazepine] Rash         Current Outpatient Medications:     lacosamide (VIMPAT) 200 mg tab tablet, Take 1.5 tablets QAM, 1 midday, and 2 at bedtime, Disp: 405 Tab, Rfl: 1    brivaracetam (BRIVIACT) 100 mg tablet, Take 1 Tab by mouth two (2) times a day.  Max Daily Amount: 300 mg., Disp: 180 Tab, Rfl: 1    FLUoxetine (PROZAC) 20 mg capsule, TAKE 1 CAPSULE BY MOUTH  DAILY, Disp: 90 Cap, Rfl: 0    brivaracetam (BRIVIACT) 50 mg tablet, Take 1 Tab by mouth two (2) times a day. Max Daily Amount: 300 mg., Disp: 180 Tab, Rfl: 1    ALPRAZolam (XANAX) 0.25 mg tablet, 1 po q12 hours prn anxiety--may cause drowsiness, Disp: 180 Tab, Rfl: 0    donepezil (ARICEPT) 10 mg tablet, Take 1 Tab by mouth nightly., Disp: 90 Tab, Rfl: 1    ibuprofen (MOTRIN) 800 mg tablet, Take 800 mg by mouth every eight (8) hours as needed for Pain., Disp: , Rfl:     tadalafil (CIALIS) 5 mg tablet, Take 5 mg by mouth daily. , Disp: , Rfl:      He  has a past medical history of Burning with urination, Hypertension, Seizures ([address] Sleep apnea, and Unspecified sleep apnea. He  has a past surgical history that includes hx other surgical (2005). He family history includes Cancer in his father; Heart Disease in his maternal uncle and maternal uncle. He  reports that he quit smoking about 18 years ago. He has a 30.00 pack-year smoking history. He has quit using smokeless tobacco. He reports that he does not drink alcohol or use drugs. Review of Systems:  Constitutional:  No significant weight loss or weight gain. Eyes:  No blurred vision. CVS:  No significant chest pain  Pulm:  No significant shortness of breath  GI:  No significant nausea or vomiting  :  No significant nocturia  Musculoskeletal:  No significant joint pain at night, occasional back pain awakens him in early morning  Skin:  No significant rashes  Neuro:  No significant dizziness , treated for seizures last seizure about 5 years ago. Psych: treated for anxiety (social stressors going through a divorce) but is much better now    Sleep Review of Systems: notable for no difficulty falling asleep; infrequent awakenings at night;  regular dreaming noted; no nightmares ; no early morning headaches; + memory problems; + concentration issues; no history of any automobile or occupational accidents due to daytime drowsiness.       Objective: Visit Vitals  /67 (BP 1 Location: Left arm, BP Patient Position: Sitting)   Pulse 67   Resp 18   Ht 6' 1\" (1.854 m)   Wt 213 lb (96.6 kg)   SpO2 95%   BMI 28.10 kg/m²         General:   Not in acute distress   Eyes:  Anicteric sclerae, no obvious strabismus   Nose:  No obvious nasal septum deviation    Oropharynx:   Class 3 oropharyngeal outlet, thick tongue base, enlarged and boggy uvula, low-lying soft palate, narrow tonsilo-pharyngeal pilars   Tonsils:   tonsils are present and normal   Neck:   Neck circ. in \"inches\": 17; midline trachea   Chest/Lungs:  Equal lung expansion, clear on auscultation    CVS:  Normal rate, regular rhythm; no JVD   Skin:  Warm to touch; no obvious rashes   Neuro:  No focal deficits ; no obvious tremor    Psych:  Normal affect,  normal countenance;          Assessment:       ICD-10-CM ICD-9-CM    1. Obstructive sleep apnea (adult) (pediatric) G47.33 327.23 SLEEP STUDY UNATTENDED, 4 CHANNEL         Plan:     * The patient currently has a Moderate Risk for having sleep apnea. STOP-BANG score 7.  * PSG was ordered for initial evaluation. I have reviewed the different types of sleep studies. Attended sleep studies and home sleep apnea tests. Home sleep testing tests only for the presence and severity of sleep apnea. he understands that if the HSAT does not provide reliable result(such as poor data/failed HSAT recording), he may have to repeat the HSAT or come in for an attended polysomnogram.     * He was provided information on sleep apnea including coresponding risk factors and the importance of proper treatment. * Counseling was provided regarding proper sleep hygiene and safe driving. * Treatment options for sleep apnea were reviewed. he is not against a trial of PAP if found to have significant sleep apnea.    The treatment plan was reviewed with the patient in detail and reviewed with the patient and the lead technologist. he understands that the lead technologist will be calling him  with the results and assisting with the next step in the treatment plan as outlined today during the consultation with me. All of his questions were addressed. 2. Seizure disorder - he will continue on his current regimen. He will follow with neurology. He understands the home test will only assess for sleep apnea which is what he is interested in. Thank you for allowing us to participate in your patient's medical care. We'll keep you updated on these investigations.   Electronically signed by    Pedro Luis Hilario MD  Diplomate in Sleep Medicine  [facility]

## 2019-03-05 NOTE — PATIENT INSTRUCTIONS
217 [facility] [place]. [place], [address]  Tel.  [phone]  Fax. 100 [facility] 60  [place], [address] [facility]  Tel.  [phone]  Fax. [phone] 3300 [facility] [address]  Tel.  [phone]  Fax. [phone]     Sleep Apnea: After Your Visit  Your Care Instructions  Sleep apnea occurs when you frequently stop breathing for 10 seconds or longer during sleep. It can be mild to severe, based on the number of times per hour that you stop breathing or have slowed breathing. Blocked or narrowed airways in your nose, mouth, or throat can cause sleep apnea. Your airway can become blocked when your throat muscles and tongue relax during sleep. Sleep apnea is common, occurring in 1 out of 20 individuals. Individuals having any of the following characteristics should be evaluated and treated right away due to high risk and detrimental consequences from untreated sleep apnea:  1. Obesity  2. Congestive Heart failure  3. Atrial Fibrillation  4. Uncontrolled Hypertension  5. Type II Diabetes  6. Night-time Arrhythmias  7. Stroke  8. Pulmonary Hypertension  9. High-risk Driving Populations (pilots, truck drivers, etc.)  10. Patients Considering Weight-loss Surgery    How do you know you have sleep apnea? You probably have sleep apnea if you answer 'yes' to 3 or more of the following questions:  S - Have you been told that you Snore? T - Are you often Tired during the day? O - Has anyone Observed you stop breathing while sleeping? P- Do you have (or are being treated for) high blood Pressure? B - Are you obese (Body Mass Index > 35)? A - Is your Age 48years old or older? N - Is your Neck size greater than 16 inches? G - Are you male Gender? A sleep physician can prescribe a breathing device that prevents tissues in the throat from blocking your airway.  Or your doctor may recommend using a dental device (oral breathing device) to help keep your airway open. In some cases, surgery may be needed to remove enlarged tissues in the throat. Follow-up care is a key part of your treatment and safety. Be sure to make and go to all appointments, and call your doctor if you are having problems. It's also a good idea to know your test results and keep a list of the medicines you take. How can you care for yourself at home? · Lose weight, if needed. It may reduce the number of times you stop breathing or have slowed breathing. · Go to bed at the same time every night. · Sleep on your side. It may stop mild apnea. If you tend to roll onto your back, sew a pocket in the back of your pajama top. Put a tennis ball into the pocket, and stitch the pocket shut. This will help keep you from sleeping on your back. · Avoid alcohol and medicines such as sleeping pills and sedatives before bed. · Do not smoke. Smoking can make sleep apnea worse. If you need help quitting, talk to your doctor about stop-smoking programs and medicines. These can increase your chances of quitting for good. · Prop up the head of your bed 4 to 6 inches by putting bricks under the legs of the bed. · Treat breathing problems, such as a stuffy nose, caused by a cold or allergies. · Use a continuous positive airway pressure (CPAP) breathing machine if lifestyle changes do not help your apnea and your doctor recommends it. The machine keeps your airway from closing when you sleep. · If CPAP does not help you, ask your doctor whether you should try other breathing machines. A bilevel positive airway pressure machine has two types of air pressureâone for breathing in and one for breathing out. Another device raises or lowers air pressure as needed while you breathe. · If your nose feels dry or bleeds when using one of these machines, talk with your doctor about increasing moisture in the air. A humidifier may help.   · If your nose is runny or stuffy from using a breathing machine, talk with your doctor about using decongestants or a corticosteroid nasal spray. When should you call for help? Watch closely for changes in your health, and be sure to contact your doctor if:  · You still have sleep apnea even though you have made lifestyle changes. · You are thinking of trying a device such as CPAP. · You are having problems using a CPAP or similar machine. Where can you learn more? Go to HealthTell. Enter L885 in the search box to learn more about \"Sleep Apnea: After Your Visit. \"   © 3252-1783 Healthwise, [employer]. Care instructions adapted under license by [address] [place] [address] [employer] (which disclaims liability or warranty for this information). This care instruction is for use with your licensed healthcare professional. If you have questions about a medical condition or this instruction, always ask your healthcare professional. Lakishazzy Catrachito any warranty or liability for your use of this information. PROPER SLEEP HYGIENE    What to avoid  · Do not have drinks with caffeine, such as coffee or black tea, for 8 hours before bed. · Do not smoke or use other types of tobacco near bedtime. Nicotine is a stimulant and can keep you awake. · Avoid drinking alcohol late in the evening, because it can cause you to wake in the middle of the night. · Do not eat a big meal close to bedtime. If you are hungry, eat a light snack. · Do not drink a lot of water close to bedtime, because the need to urinate may wake you up during the night. · Do not read or watch TV in bed. Use the bed only for sleeping and sexual activity. What to try  · Go to bed at the same time every night, and wake up at the same time every morning. Do not take naps during the day. · Keep your bedroom quiet, dark, and cool. · Get regular exercise, but not within 3 to 4 hours of your bedtime. .  · Sleep on a comfortable pillow and mattress.   · If watching the clock makes you anxious, turn it facing away from you so you cannot see the time. · If you worry when you lie down, start a worry book. Well before bedtime, write down your worries, and then set the book and your concerns aside. · Try meditation or other relaxation techniques before you go to bed. · If you cannot fall asleep, get up and go to another room until you feel sleepy. Do something relaxing. Repeat your bedtime routine before you go to bed again. · Make your house quiet and calm about an hour before bedtime. Turn down the lights, turn off the TV, log off the computer, and turn down the volume on music. This can help you relax after a busy day. Drowsy Driving  The [address] [place] [address] Road Traffic Safety Administration cites drowsiness as a causing factor in more than 667,095 police reported crashes annually, resulting in 76,000 injuries and 1,500 deaths. Other surveys suggest 55% of people polled have driven while drowsy in the past year, 23% had fallen asleep but not crashed, 3% crashed, and 2% had and accident due to drowsy driving. Who is at risk? Young Drivers: One study of drowsy driving accidents states that 55% of the drivers were under 25 years. Of those, 75% were male. Shift Workers and Travelers: People who work overnight or travel across time zones frequently are at higher risk of experiencing Circadian Rhythm Disorders. They are trying to work and function when their body is programed to sleep. Sleep Deprived: Lack of sleep has a serious impact on your ability to pay attention or focus on a task. Consistently getting less than the average of 8 hours your body needs creates partial or cumulative sleep deprivation. Untreated Sleep Disorders: Sleep Apnea, Narcolepsy, R.L.S., and other sleep disorders (untreated) prevent a person from getting enough restful sleep. This leads to excessive daytime sleepiness and increases the risk for drowsy driving accidents by up to 7 times.   Medications / Alcohol: Even over the counter medications can cause drowsiness. Medications that impair a drivers attention should have a warning label. Alcohol naturally makes you sleepy and on its own can cause accidents. Combined with excessive drowsiness its effects are amplified. Signs of Drowsy Driving:   * You don't remember driving the last few miles   * You may drift out of your sy   * You are unable to focus and your thoughts wander   * You may yawn more often than normal   * You have difficulty keeping your eyes open / nodding off   * Missing traffic signs, speeding, or tailgating  Prevention-   Good sleep hygiene, lifestyle and behavioral choices have the most impact on drowsy driving. There is no substitute for sleep and the average person requires 8 hours nightly. If you find yourself driving drowsy, stop and sleep. Consider the sleep hygiene tips provided during your visit as well. Medication Refill Policy: Refills for all medications require 1 week advance notice. Please have your pharmacy fax a refill request. We are unable to fax, or call in \"controled substance\" medications and you will need to pick these prescriptions up from our office. [employer] Activation    Thank you for requesting access to [employer]. Please follow the instructions below to securely access and download your online medical record. [employer] allows you to send messages to your doctor, view your test results, renew your prescriptions, schedule appointments, and more. How Do I Sign Up? 1. In your internet browser, go to [email or web address]. 2. Click on the First Time User? Click Here link in the Sign In box. You will see the New Member Sign Up page. 3. Enter your [employer] Access Code exactly as it appears below. You will not need to use this code after youve completed the sign-up process. If you do not sign up before the expiration date, you must request a new code. [employer] Access Code:  Activation code not generated  Current [employer] Status: Active (This is the date your [employer] access code will )    4. Enter the last four digits of your Social Security Number (xxxx) and Date of Birth (mm/dd/yyyy) as indicated and click Submit. You will be taken to the next sign-up page. 5. Create a Mobiquityt ID. This will be your [employer] login ID and cannot be changed, so think of one that is secure and easy to remember. 6. Create a [employer] password. You can change your password at any time. 7. Enter your Password Reset Question and Answer. This can be used at a later time if you forget your password. 8. Enter your e-mail address. You will receive e-mail notification when new information is available in [address] 9. Click Sign Up. You can now view and download portions of your medical record. 10. Click the Download Summary menu link to download a portable copy of your medical information. Additional Information    If you have questions, please call [phone]. Remember, [employer] is NOT to be used for urgent needs. For medical emergencies, dial 911.

## 2019-03-05 NOTE — PROGRESS NOTES
217 [facility]. [place], [address]  Tel.  [phone]  Fax. 100 [facility] 60  [place], [address] [facility]  Tel.  [phone]  Fax. [phone] [address] [facility] [address]   Tel.  [phone]  Fax. [phone]       S>Agus Potts is a 62 y.o. male seen today to receive a home sleep testing unit (HST). · Patient was educated on proper hookup and operation of the HST. · Instruction forms and documentation were reviewed and signed. · The patient demonstrated good understanding of the HST. O>    Visit Vitals  /67 (BP 1 Location: Left arm, BP Patient Position: Sitting)   Pulse 67   Resp 18   Ht 6' 1\" (1.854 m)   Wt 213 lb (96.6 kg)   SpO2 95%   BMI 28.10 kg/m²    Neck circ. in \"inches\": 17    A>  1. Obstructive sleep apnea (adult) (pediatric)          P>  · General information regarding operations and maintenance of the device was provided. · He was provided information on sleep apnea including coresponding risk factors and the importance of proper treatment. · Follow-up appointment was made to return the HST. He will be contacted once the results have been reviewed. · He was asked to contact our office for any problems regarding his home sleep test study.

## 2019-03-06 ENCOUNTER — DOCUMENTATION ONLY (OUTPATIENT)
Dept: SLEEP MEDICINE | Age: 59
End: 2019-03-06

## 2019-03-08 ENCOUNTER — TELEPHONE (OUTPATIENT)
Dept: SLEEP MEDICINE | Age: 59
End: 2019-03-08

## 2019-03-18 RX ORDER — FLUOXETINE HYDROCHLORIDE 20 MG/1
CAPSULE ORAL
Qty: 90 CAP | Refills: 0 | Status: SHIPPED | OUTPATIENT
Start: 2019-03-18 | End: 2019-08-30 | Stop reason: SDUPTHER

## 2019-03-20 ENCOUNTER — HOSPITAL ENCOUNTER (OUTPATIENT)
Dept: SLEEP MEDICINE | Age: 59
Discharge: HOME | End: 2019-03-20
Payer: COMMERCIAL

## 2019-03-20 PROCEDURE — 95806 SLEEP STUDY UNATT&RESP EFFT: CPT | Performed by: INTERNAL MEDICINE

## 2019-03-28 ENCOUNTER — TELEPHONE (OUTPATIENT)
Dept: SLEEP MEDICINE | Age: 59
End: 2019-03-28

## 2019-03-28 DIAGNOSIS — G47.33 OBSTRUCTIVE SLEEP APNEA (ADULT) (PEDIATRIC): Primary | ICD-10-CM

## 2019-04-02 ENCOUNTER — DOCUMENTATION ONLY (OUTPATIENT)
Dept: SLEEP MEDICINE | Age: 59
End: 2019-04-02

## 2019-04-04 ENCOUNTER — DOCUMENTATION ONLY (OUTPATIENT)
Dept: SLEEP MEDICINE | Age: 59
End: 2019-04-04

## 2019-04-16 NOTE — TELEPHONE ENCOUNTER
Reviewed sleep study results with patient. He expressed understanding and is willing to proceed with an in-lab PSG. He requests to call back on 4/17/19 to schedule study.

## 2019-04-16 NOTE — TELEPHONE ENCOUNTER
2nd failed HSAT, schedule attended psg, seizure precautions  Poor flow and oximeter signal on HSAT  Staff to schedule

## 2019-04-18 ENCOUNTER — DOCUMENTATION ONLY (OUTPATIENT)
Dept: SLEEP MEDICINE | Age: 59
End: 2019-04-18

## 2019-04-18 NOTE — PROGRESS NOTES
This note is being routed to Dr. Megan Pisano. Sleep Medicine consult note and sleep study report in patient's chart for review.     Thank you for the referral.

## 2019-05-28 ENCOUNTER — HOSPITAL ENCOUNTER (OUTPATIENT)
Dept: SLEEP MEDICINE | Age: 59
Discharge: HOME | End: 2019-05-28
Payer: COMMERCIAL

## 2019-05-28 DIAGNOSIS — G31.84 MCI (MILD COGNITIVE IMPAIRMENT): Primary | ICD-10-CM

## 2019-05-28 DIAGNOSIS — G47.33 OBSTRUCTIVE SLEEP APNEA (ADULT) (PEDIATRIC): ICD-10-CM

## 2019-05-28 PROCEDURE — 95810 POLYSOM 6/> YRS 4/> PARAM: CPT | Performed by: INTERNAL MEDICINE

## 2019-05-28 RX ORDER — DONEPEZIL HYDROCHLORIDE 10 MG/1
10 TABLET, FILM COATED ORAL
Qty: 90 TAB | Refills: 1 | Status: SHIPPED | OUTPATIENT
Start: 2019-05-28 | End: 2019-07-10 | Stop reason: SDUPTHER

## 2019-05-31 ENCOUNTER — TELEPHONE (OUTPATIENT)
Dept: SLEEP MEDICINE | Age: 59
End: 2019-05-31

## 2019-05-31 DIAGNOSIS — G47.33 OBSTRUCTIVE SLEEP APNEA (ADULT) (PEDIATRIC): Primary | ICD-10-CM

## 2019-05-31 NOTE — TELEPHONE ENCOUNTER
Results of sleep study in [facility]-drive  Lead tech to convey results to patient  Polysomnogram showed AHI- 20/hour. This is consistent with moderate sleep apnea. Lowest oxygen desaturation associated with an apnea was 69%  PAP therapy indicated. PAP will be ordered. Staff to Order PAP and call patient and let them know which [employer] company they should be hearing from. Schedule for first adherence visit in 6 weeks.

## 2019-06-06 DIAGNOSIS — G40.219 PARTIAL EPILEPSY WITH IMPAIRMENT OF CONSCIOUSNESS, INTRACTABLE (HCC): ICD-10-CM

## 2019-06-06 RX ORDER — ALPRAZOLAM 0.25 MG/1
TABLET ORAL
Qty: 180 TAB | Refills: 0 | Status: SHIPPED | OUTPATIENT
Start: 2019-06-06 | End: 2021-09-08

## 2019-07-08 ENCOUNTER — TELEPHONE (OUTPATIENT)
Dept: SLEEP MEDICINE | Age: 59
End: 2019-07-08

## 2019-07-08 DIAGNOSIS — G47.33 OBSTRUCTIVE SLEEP APNEA (ADULT) (PEDIATRIC): Primary | ICD-10-CM

## 2019-07-08 NOTE — TELEPHONE ENCOUNTER
Attended polysomnogram showed an AHI of 20/hour  This is consistent with significant sleep apnea. Based on these results, PAP therapy would be beneficial.     Order PAP and call patient and let them know which [employer] company they should be hearing from. Schedule for first adherence visit in 6 weeks.

## 2019-07-10 ENCOUNTER — OFFICE VISIT (OUTPATIENT)
Dept: NEUROLOGY | Age: 59
End: 2019-07-10

## 2019-07-10 VITALS
RESPIRATION RATE: 20 BRPM | OXYGEN SATURATION: 96 % | SYSTOLIC BLOOD PRESSURE: 126 MMHG | BODY MASS INDEX: 27.35 KG/M2 | DIASTOLIC BLOOD PRESSURE: 86 MMHG | HEIGHT: 74 IN | HEART RATE: 62 BPM

## 2019-07-10 DIAGNOSIS — G40.219 PARTIAL EPILEPSY WITH IMPAIRMENT OF CONSCIOUSNESS, INTRACTABLE (HCC): Primary | ICD-10-CM

## 2019-07-10 DIAGNOSIS — G31.84 MCI (MILD COGNITIVE IMPAIRMENT): ICD-10-CM

## 2019-07-10 RX ORDER — DONEPEZIL HYDROCHLORIDE 10 MG/1
10 TABLET, FILM COATED ORAL
Qty: 90 TAB | Refills: 1 | Status: SHIPPED | OUTPATIENT
Start: 2019-07-10 | End: 2019-11-17 | Stop reason: SDUPTHER

## 2019-07-10 RX ORDER — LACOSAMIDE 200 MG/1
TABLET ORAL
Qty: 405 TAB | Refills: 1 | Status: SHIPPED | OUTPATIENT
Start: 2019-07-10 | End: 2019-08-29 | Stop reason: SDUPTHER

## 2019-07-10 NOTE — PROGRESS NOTES
Date:  07/10/19     Name:  Wayne Avalos  :  1960  MRN:  [id number]     PCP:  Layne Aguero MD    Chief Complaint   Patient presents with    Epilepsy    Dementia     HISTORY OF PRESENT ILLNESS: Follow up for epilepsy. He is taking 150mg of the Briviact twice a day and Vimpat 300mg bid. No additional breakthrough seizures since last visit. He continues to take Aricept 10 mg daily. He still has not had reassessment with neuropsych testing. Except as noted above, denies  fever, chills, cough. No CP or SOB. No dysuria, loss of bowel or bladder control. No Weight loss. Appetite good. Sleeping well. No sweats. No edema. No bruising or bleeding. No nausea or vomit. No diarrhea. No frequency, urgency, No depressive sxs. No anxiety. Denies sore throat, nasal congestion, nasal discharge, epistaxis, tinnitus, hearing loss, back pain, muscle pain, or joint pain. Current Outpatient Medications   Medication Sig    ALPRAZolam (XANAX) 0.25 mg tablet 1 po q12 hours prn anxiety--may cause drowsiness    donepezil (ARICEPT) 10 mg tablet Take 1 Tab by mouth nightly.  FLUoxetine (PROZAC) 20 mg capsule TAKE 1 CAPSULE BY MOUTH  DAILY    lacosamide (VIMPAT) 200 mg tab tablet Take 1.5 tablets QAM, 1 midday, and 2 at bedtime    brivaracetam (BRIVIACT) 100 mg tablet Take 1 Tab by mouth two (2) times a day. Max Daily Amount: 300 mg.    brivaracetam (BRIVIACT) 50 mg tablet Take 1 Tab by mouth two (2) times a day. Max Daily Amount: 300 mg.  ibuprofen (MOTRIN) 800 mg tablet Take 800 mg by mouth every eight (8) hours as needed for Pain.  tadalafil (CIALIS) 5 mg tablet Take 5 mg by mouth daily. No current facility-administered medications for this visit.       Allergies   Allergen Reactions    Tegretol [Carbamazepine] Rash     Past Medical History:   Diagnosis Date    Burning with urination     Hypertension     Seizures (HCC)     Sleep apnea     Pt has machine but does not use    Unspecified sleep apnea      Past Surgical History:   Procedure Laterality Date    HX OTHER SURGICAL  2005    vagus nerve stimulator     Social History     Socioeconomic History    Marital status:      Spouse name: Not on file    Number of children: Not on file    Years of education: Not on file    Highest education level: Not on file   Occupational History    Not on file   Social Needs    Financial resource strain: Not on file    Food insecurity:     Worry: Not on file     Inability: Not on file    Transportation needs:     Medical: Not on file     Non-medical: Not on file   Tobacco Use    Smoking status: Former Smoker     Packs/day: 1.50     Years: 20.00     Pack years: 30.00     Last attempt to quit: 2001     Years since quittin.4    Smokeless tobacco: Former User   Substance and Sexual Activity    Alcohol use: No    Drug use: No    Sexual activity: Never   Lifestyle    Physical activity:     Days per week: Not on file     Minutes per session: Not on file    Stress: Not on file   Relationships    Social connections:     Talks on phone: Not on file     Gets together: Not on file     Attends [employer] service: Not on file     Active member of club or organization: Not on file     Attends meetings of clubs or organizations: Not on file     Relationship status: Not on file    Intimate partner violence:     Fear of current or ex partner: Not on file     Emotionally abused: Not on file     Physically abused: Not on file     Forced sexual activity: Not on file   Other Topics Concern   [address] Service Not Asked    Blood Transfusions Not Asked    Caffeine Concern Not Asked    Occupational Exposure Not Asked   Jimmie Chenly Hazards Not Asked    Sleep Concern Not Asked    Stress Concern Not Asked    Weight Concern Not Asked    Special Diet Not Asked    Back Care Not Asked    Exercise Not Asked    Bike Helmet Not Asked    [address] Not Asked    Self-Exams Not Asked Social History Narrative    Not on file     Family History   Problem Relation Age of Onset    Heart Disease Maternal Uncle     Cancer Father     Heart Disease Maternal Uncle        PHYSICAL EXAMINATION:    Visit Vitals  /86   Pulse 62   Resp 20   Ht 6' 2\" (1.88 m)   SpO2 96%   BMI (P) 26.59 kg/m²     General:  Well defined, nourished, and groomed individual in no acute distress.    Neck:             Supple, nontender, no bruits, no pain with resistance to active range of motion.    Heart:            Regular rate and rhythm, no murmurs, rub, or gallop.  Normal S1S2. Lungs:  Clear to auscultation bilaterally with equal chest expansion, no cough, no wheeze  Musculoskeletal:  Extremities revealed no edema and had full range of motion of joints.    Psych:  Good mood and bright affect      NEUROLOGICAL EXAMINATION:     Mental Status:   Alert and oriented to person, place, and time with recent and remote memory intact.  Attention span and concentration are normal. Speech is fluent with a full fund of knowledge.        Cranial Nerves:    II, III, IV, VI:  Visual acuity grossly intact. Visual fields are normal.    Pupils are equal, round, and reactive to light and accommodation.    Extra-ocular movements are full and fluid.  Fundoscopic exam was benign, no ptosis or nystagmus. V-XII:             Hearing is grossly intact.  Facial features are symmetric, with normal sensation and strength.  The palate rises symmetrically and the tongue protrudes midline.  Sternocleidomastoids 5/5.        Motor Examination:               Normal tone, bulk, and strength, 5/5 muscle strength throughout.    Coordination:  Finger to nose and rapid arm movement testing was normal.   No resting or intention tremor  Gait and Station:  Steady while walking.  Normal arm swing.  No pronator drift.   No muscle wasting or fasiculations noted.    Reflexes:  DTRs 2+ throughout.        VNS interrogated and settings are as follows:    Battery 50-75%  Output current: 2.25  Signal frequency: 30   Pulse width: 130  Signal on-time: 30 sec  Signal off-time: 5 min     Autostim   Output current  2.5  Pulse width at 130sec  On time  60 sec. Heart detection at 20%     Magnet current: 2.75  Magnet on-time: 60   Magnet pulse width: 130      Model AspireSR 106  Serial# [id number]  Implanted 2016-07-01     ASSESSMENT AND PLAN    ICD-10-CM ICD-9-CM    1. Partial epilepsy with impairment of consciousness, intractable (HCC) G40.219 345.41 lacosamide (VIMPAT) 200 mg tab tablet      brivaracetam (BRIVIACT) 100 mg tablet      brivaracetam (BRIVIACT) 50 mg tablet      IA ELEC BAYRON IMPLT NPGT PHYS/QHP W/O PROGRAMMING   2. MCI (mild cognitive impairment) G31.84 331.83 donepezil (ARICEPT) 10 mg tablet     MCI on Aricept. He was to have neurocognitive reassessment but this was never completed. However, he feels that he is doing better especially now that he is not on such a strong dose of Keppra. We will try to have the neuropsychological evaluation rescheduled. With regard to his epilepsy, this is stable on present therapy of Briviact and Vimpat with far less sedation. VNS was interrogated and appears to be functioning properly with a battery of 50 to 75%. He will continue taking the Briviact and Vimpat as previously prescribed. Follow up in six months. Betzaida Fong

## 2019-07-11 ENCOUNTER — DOCUMENTATION ONLY (OUTPATIENT)
Dept: SLEEP MEDICINE | Age: 59
End: 2019-07-11

## 2019-07-11 NOTE — PROGRESS NOTES
Order faxed over. Tried to call patient' cell no. \"Voice Mail Box Full\" message appeared. Called home number and left message to call back. Need to inform about DME and schedule 1st adh appointment.

## 2019-07-22 ENCOUNTER — TELEPHONE (OUTPATIENT)
Dept: NEUROLOGY | Age: 59
End: 2019-07-22

## 2019-07-22 NOTE — TELEPHONE ENCOUNTER
Pt is out of medication and is checking the status as to why he has not received his medication from [facility]  Please advise  Best # [phone]

## 2019-07-22 NOTE — TELEPHONE ENCOUNTER
Called [facility] to submit PA for Briviact 50 & 100mg urgently. PA's were APPROVED. Effective dates 07/22/19 - 07/21/20. Case # [id number]. Called patient to inform them of approval. Patient stated that he was concerned the medication would not ship in a timely, so I gave him Auth number and he stated he will contact [facility] to see if they can rush his medication. Approval will be scanned into media once received by insurance.

## 2019-07-30 ENCOUNTER — TELEPHONE (OUTPATIENT)
Dept: NEUROLOGY | Age: 59
End: 2019-07-30

## 2019-07-30 NOTE — TELEPHONE ENCOUNTER
----- Message from Dolores Clarke sent at 7/30/2019 12:33 PM EDT -----  Regarding: NP Elías/Telephone  General Message/Vendor Calls    Caller's first and last name:Agus Perdomo 26      Reason for call:nurse call back      Callback required yes/no and why:yes      Best contact number(s):[phone]      Details to clarify the request: Pt requested a call from the nurse regarding medication(\"Vimpact\") that was prescribed for a 30 day supply that should have been for a 90 day supply.       Dolores Clarke

## 2019-07-31 NOTE — TELEPHONE ENCOUNTER
Returned his call after speaking with the [employer], he stated he sent them the new Rxs for his seiuzure medications and they have not received them. The Briviact they can do the 90-day supply, the other for Vimpat they can not since it is a controlled medication. He stated after this he will probaby try to get them locally from now on so that he doesn't have to go through this again. He stated he had spoken with several representatives and supervisors and each time has gotten a different answer.

## 2019-08-06 ENCOUNTER — OP HISTORICAL/CONVERTED ENCOUNTER (OUTPATIENT)
Dept: OTHER | Age: 59
End: 2019-08-06

## 2019-08-09 ENCOUNTER — DOCUMENTATION ONLY (OUTPATIENT)
Dept: SLEEP MEDICINE | Age: 59
End: 2019-08-09

## 2019-08-29 DIAGNOSIS — G40.219 PARTIAL EPILEPSY WITH IMPAIRMENT OF CONSCIOUSNESS, INTRACTABLE (HCC): ICD-10-CM

## 2019-08-29 RX ORDER — LACOSAMIDE 200 MG/1
TABLET ORAL
Qty: 405 TAB | Refills: 1 | Status: SHIPPED | OUTPATIENT
Start: 2019-08-29 | End: 2020-01-30 | Stop reason: SDUPTHER

## 2019-08-29 NOTE — TELEPHONE ENCOUNTER
Pt is requesting a refill on Vimpat 200 mg  He does not need a return call to speak to you about the medication  Please call when script is ready for  and he will take to his local pharmacy because he only has 3 days worth left  Best #[phone]

## 2019-08-30 ENCOUNTER — TELEPHONE (OUTPATIENT)
Dept: NEUROLOGY | Age: 59
End: 2019-08-30

## 2019-08-30 RX ORDER — FLUOXETINE HYDROCHLORIDE 20 MG/1
CAPSULE ORAL
Qty: 90 CAP | Refills: 0 | Status: SHIPPED | OUTPATIENT
Start: 2019-08-30 | End: 2019-10-19 | Stop reason: SDUPTHER

## 2019-09-03 ENCOUNTER — OFFICE VISIT (OUTPATIENT)
Dept: SLEEP MEDICINE | Age: 59
End: 2019-09-03

## 2019-09-03 ENCOUNTER — DOCUMENTATION ONLY (OUTPATIENT)
Dept: SLEEP MEDICINE | Age: 59
End: 2019-09-03

## 2019-09-03 VITALS
HEART RATE: 74 BPM | OXYGEN SATURATION: 98 % | WEIGHT: 197 LBS | DIASTOLIC BLOOD PRESSURE: 70 MMHG | HEIGHT: 74 IN | SYSTOLIC BLOOD PRESSURE: 116 MMHG | BODY MASS INDEX: 25.28 KG/M2

## 2019-09-03 DIAGNOSIS — R56.9 SEIZURE (HCC): ICD-10-CM

## 2019-09-03 DIAGNOSIS — G47.33 OBSTRUCTIVE SLEEP APNEA (ADULT) (PEDIATRIC): Primary | ICD-10-CM

## 2019-09-03 NOTE — PROGRESS NOTES
[address] [place], [address]   Tel.  [phone]   Fax. [address]   [place], [address]   Tel.  [phone]   Fax. [phone] [address]   Tel.  [phone]   Fax. [phone]       Subjective:   Lupillo Siegel is a 62 y.o. male seen for a positive airway pressure follow-up, last seen by Dr. Gray Hussein on 3/5/2019, prior notes reviewed in detail. In lab sleep test 5/2019 showed AHI of 20.0/hr with a lowest SaO2 of 69%. APAP device 5-15 cm H2O ordered, he is here for first adherence. He reports no problems using the device. The following concerns reviewed:    Drowsiness no Problems exhaling no   Snoring no Forget to put on no   Mask Comfortable yes Can't fall asleep no   Dry Mouth no Mask falls off no   Air Leaking no Frequent awakenings no       He admits that his sleep has significantly improved on PAP therapy using Full face mask and heated tubing. Review of device download indicated:  Auto pressure: 5-15 cmH2O; Average % Night in Large Leak:3.5; % Used Days >= 4 hours: 87. Therapy Apnea Index averaged over PAP use: 14.6 /hr which reflects improved sleep breathing condition. [place] Sleepiness Score: 12 and Modified F.O.S.Q. Score Total / 2: 16 which reflects improved sleep quality over therapy time. Allergies   Allergen Reactions    Tegretol [Carbamazepine] Rash       He has a current medication list which includes the following prescription(s): fluoxetine, lacosamide, brivaracetam, brivaracetam, donepezil, alprazolam, ibuprofen, and tadalafil. Venita Congo He  has a past medical history of Burning with urination, Hypertension, Seizures ([address] Sleep apnea, and Unspecified sleep apnea. Sleep Review of Systems: notable for Negative difficulty falling asleep; Positive awakenings at night; Negative early morning headaches; Negative memory problems; Negative concentration issues;  Negative chest pain; Negative shortness of breath; Negative significant joint pain at night; Negative significant muscle pain at night; Negative rashes or itching; Negative heartburn; Negative significant mood issues; 2-3 afternoon naps per week; Negative history of any automobile or occupational accidents due to daytime drowsiness      Objective:     Visit Vitals  /70 (BP 1 Location: Left arm, BP Patient Position: Sitting)   Pulse 74   Ht 6' 2\" (1.88 m)   Wt 197 lb (89.4 kg)   SpO2 98%   BMI 25.29 kg/m²            General:   Alert, oriented, not in acute distress   Eyes:  Anicteric Sclerae; no obvious strabismus   Nose:  No obvious nasal septum deviation    Oropharynx:   Mallampati score 3, thick tongue base, uvula seen, low-lying soft palate, narrow tonsilo-pharyngeal pilars   Neck:   Midline trachea   Chest/Lungs:  Symmetrical lung expansion, clear lung fields on auscultation    CVS:  Normal rate, regular rhythm,  no JVD   Extremities:  No obvious rashes, no edema    Neuro:  No focal deficits; No obvious tremor    Psych:  Normal affect,  normal countenance       Assessment:       ICD-10-CM ICD-9-CM    1. Obstructive sleep apnea (adult) (pediatric) G47.33 327.23 AMB SUPPLY ORDER   2. Seizure (HCC) R56.9 780.39        AHI = 20.0(5/2019). On APAP :  5-15 cmH2O. He is compliant with PAP therapy and PAP continues to benefit patient and remains necessary for control of his sleep apnea. Pressure change to 10-17 to address elevated AHI. Plan:     Follow-up and Dispositions    · Return in about 1 year (around 9/3/2020). * Change pressure setting to APAP 10-17 cm H2O, ramp 20 minutes starting pressure of 8 cmH2O, download in 3 weeks     * Discussed with patient that we may need to do an in lab titration to determine optimal pressure if AHI is not improved with APAP pressure change.     Orders Placed This Encounter    AMB SUPPLY ORDER     Diagnosis: (G47.33) JUANITA (obstructive sleep apnea)  (primary encounter diagnosis) Change pressure setting to APAP 10-17, change ramp to 20 minutes with a starting pressure of 8 cm H2O. Changes made in sleep lab. BREN Rueda; NPI: [id number]    Electronically signed. Date:- 09/03/19       * Counseling was provided regarding the importance of regular PAP use with emphasis on ensuring sufficient total sleep time, proper sleep hygiene, and safe driving. * Re-enforced proper and regular cleaning for the device. * He was asked to contact our office for any problems regarding PAP therapy. 2. Seizures - continue on current regimen    Patient's phone number [phone] (home)  was reviewed and confirmed for accuracy. He gives permission for messages regarding results and appointments to be left at that number. BREN Rueda, [address] [place] [address]  Electronically signed.  09/03/19

## 2019-09-03 NOTE — PATIENT INSTRUCTIONS
[address] [facility] [place], [address]  Tel.  [phone]  Fax. [address] [facility] [address]  [place], [address] [facility] [address]  Tel.  [phone]  Fax. [phone] [address]  Tel.  [phone]  Fax. [phone]     Learning About CPAP for Sleep Apnea  What is CPAP? CPAP is a small machine that you use at home every night while you sleep. It increases air pressure in your throat to keep your airway open. When you have sleep apnea, this can help you sleep better so you feel much better. CPAP stands for \"continuous positive airway pressure. \"  The CPAP machine will have one of the following:  · A mask that covers your nose and mouth  · Prongs that fit into your nose  · A mask that covers your nose only, the most common type. This type is called NCPAP. The N stands for \"nasal.\"  Why is it done? CPAP is usually the best treatment for obstructive sleep apnea. It is the first treatment choice and the most widely used. Your doctor may suggest CPAP if you have:  · Moderate to severe sleep apnea. · Sleep apnea and coronary artery disease (CAD) or heart failure. How does it help? · CPAP can help you have more normal sleep, so you feel less sleepy and more alert during the daytime. · CPAP may help keep heart failure or other heart problems from getting worse. · NCPAP may help lower your blood pressure. · If you use CPAP, your bed partner may also sleep better because you are not snoring or restless. What are the side effects? Some people who use CPAP have:  · A dry or stuffy nose and a sore throat. · Irritated skin on the face. · Sore eyes. · Bloating. If you have any of these problems, work with your doctor to fix them. Here are some things you can try:  · Be sure the mask or nasal prongs fit well. · See if your doctor can adjust the pressure of your CPAP. · If your nose is dry, try a humidifier.   · If your nose is runny or stuffy, try decongestant medicine or a steroid nasal spray. If these things do not help, you might try a different type of machine. Some machines have air pressure that adjusts on its own. Others have air pressures that are different when you breathe in than when you breathe out. This may reduce discomfort caused by too much pressure in your nose. Where can you learn more? Go to [employer]  Enter Letty Hannah in the search box to learn more about \"Learning About CPAP for Sleep Apnea. \"   © 8303-4108 Healthwise, Incorporated. Care instructions adapted under license by [employer] (which disclaims liability or warranty for this information). This care instruction is for use with your licensed healthcare professional. If you have questions about a medical condition or this instruction, always ask your healthcare professional. [address] any warranty or liability for your use of this information. Content Version: 7.5.24307; Last Revised: January 11, 2010  PROPER SLEEP HYGIENE    What to avoid  · Do not have drinks with caffeine, such as coffee or black tea, for 8 hours before bed. · Do not smoke or use other types of tobacco near bedtime. Nicotine is a stimulant and can keep you awake. · Avoid drinking alcohol late in the evening, because it can cause you to wake in the middle of the night. · Do not eat a big meal close to bedtime. If you are hungry, eat a light snack. · Do not drink a lot of water close to bedtime, because the need to urinate may wake you up during the night. · Do not read or watch TV in bed. Use the bed only for sleeping and sexual activity. What to try  · Go to bed at the same time every night, and wake up at the same time every morning. Do not take naps during the day. · Keep your bedroom quiet, dark, and cool. · Get regular exercise, but not within 3 to 4 hours of your bedtime. .  · Sleep on a comfortable pillow and mattress.   · If watching the clock makes you anxious, turn it facing away from you so you cannot see the time. · If you worry when you lie down, start a worry book. Well before bedtime, write down your worries, and then set the book and your concerns aside. · Try meditation or other relaxation techniques before you go to bed. · If you cannot fall asleep, get up and go to another room until you feel sleepy. Do something relaxing. Repeat your bedtime routine before you go to bed again. · Make your house quiet and calm about an hour before bedtime. Turn down the lights, turn off the TV, log off the computer, and turn down the volume on music. This can help you relax after a busy day. Drowsy Driving: The [facility] cites drowsiness as a causing factor in more than 422,823 police reported crashes annually, resulting in 76,000 injuries and 1,500 deaths. Other surveys suggest 55% of people polled have driven while drowsy in the past year, 23% had fallen asleep but not crashed, 3% crashed, and 2% had and accident due to drowsy driving. Who is at risk? Young Drivers: One study of drowsy driving accidents states that 55% of the drivers were under 25 years. Of those, 75% were male. Shift Workers and Travelers: People who work overnight or travel across time zones frequently are at higher risk of experiencing Circadian Rhythm Disorders. They are trying to work and function when their body is programed to sleep. Sleep Deprived: Lack of sleep has a serious impact on your ability to pay attention or focus on a task. Consistently getting less than the average of 8 hours your body needs creates partial or cumulative sleep deprivation. Untreated Sleep Disorders: Sleep Apnea, Narcolepsy, R.L.S., and other sleep disorders (untreated) prevent a person from getting enough restful sleep. This leads to excessive daytime sleepiness and increases the risk for drowsy driving accidents by up to 7 times.   Medications / Alcohol: Even over the counter medications can cause drowsiness. Medications that impair a drivers attention should have a warning label. Alcohol naturally makes you sleepy and on its own can cause accidents. Combined with excessive drowsiness its effects are amplified. Signs of Drowsy Driving:   * You don't remember driving the last few miles   * You may drift out of your sy   * You are unable to focus and your thoughts wander   * You may yawn more often than normal   * You have difficulty keeping your eyes open / nodding off   * Missing traffic signs, speeding, or tailgating  Prevention-   Good sleep hygiene, lifestyle and behavioral choices have the most impact on drowsy driving. There is no substitute for sleep and the average person requires 8 hours nightly. If you find yourself driving drowsy, stop and sleep. Consider the sleep hygiene tips provided during your visit as well. Medication Refill Policy: Refills for all medications require 1 week advance notice. Please have your pharmacy fax a refill request. We are unable to fax, or call in \"controled substance\" medications and you will need to pick these prescriptions up from our office. [employer] Activation    Thank you for requesting access to [employer]. Please follow the instructions below to securely access and download your online medical record. [employer] allows you to send messages to your doctor, view your test results, renew your prescriptions, schedule appointments, and more. How Do I Sign Up? 1. In your internet browser, go to [email or web address]. 2. Click on the First Time User? Click Here link in the Sign In box. You will see the New Member Sign Up page. 3. Enter your [employer] Access Code exactly as it appears below. You will not need to use this code after youve completed the sign-up process. If you do not sign up before the expiration date, you must request a new code. [employer] Access Code:  Activation code not generated  Current ivWatch Status: Active (This is the date your ivWatch access code will )    4. Enter the last four digits of your Social Security Number (xxxx) and Date of Birth (mm/dd/yyyy) as indicated and click Submit. You will be taken to the next sign-up page. 5. Create a Clinical Insightt ID. This will be your ivWatch login ID and cannot be changed, so think of one that is secure and easy to remember. 6. Create a ivWatch password. You can change your password at any time. 7. Enter your Password Reset Question and Answer. This can be used at a later time if you forget your password. 8. Enter your e-mail address. You will receive e-mail notification when new information is available in [address] 9. Click Sign Up. You can now view and download portions of your medical record. 10. Click the Download Summary menu link to download a portable copy of your medical information. Additional Information    If you have questions, please call [phone]. Remember, ivWatch is NOT to be used for urgent needs. For medical emergencies, dial 911.

## 2019-09-05 DIAGNOSIS — G40.219 PARTIAL EPILEPSY WITH IMPAIRMENT OF CONSCIOUSNESS, INTRACTABLE (HCC): Primary | ICD-10-CM

## 2019-09-05 RX ORDER — LACOSAMIDE 150 MG/1
150 TABLET ORAL DAILY
Qty: 42 TAB | Refills: 0 | Status: SHIPPED | COMMUNITY
Start: 2019-09-05 | End: 2020-01-30

## 2019-09-05 RX ORDER — LACOSAMIDE 100 MG/1
100 TABLET ORAL DAILY
Qty: 42 TAB | Refills: 0 | Status: SHIPPED | COMMUNITY
Start: 2019-09-05 | End: 2020-01-30

## 2019-09-05 RX ORDER — LACOSAMIDE 50 MG/1
50 TABLET ORAL DAILY
Qty: 42 TAB | Refills: 0 | Status: SHIPPED | COMMUNITY
Start: 2019-09-05 | End: 2020-01-30

## 2019-09-05 NOTE — TELEPHONE ENCOUNTER
----- Message from Lex Mendoza 8576 sent at 9/5/2019  3:06 PM EDT -----  Regarding: Any Mckinley/ telephone  Patient return call    Caller's first and last name and relationship (if not the patient): pt      Best contact number(s): [phone]      Whose call is being returned: Hunter Sanderson      Details to clarify the request:      Lex Contrerasarez 4399

## 2019-09-05 NOTE — TELEPHONE ENCOUNTER
Pt is checking status of why process is taking so long to get a refill on Vimpax   Pt's medication has been denied and Pt is waiting for appeal to go through per Evone Ready  Pt will be out of medication on Saturday   Please advise pt as to what he should do  Best # [phone]

## 2019-09-09 ENCOUNTER — TELEPHONE (OUTPATIENT)
Dept: NEUROLOGY | Age: 59
End: 2019-09-09

## 2019-09-09 NOTE — TELEPHONE ENCOUNTER
----- Message from Tiarra Garcia sent at 9/9/2019  8:07 AM EDT -----  Regarding: Dr. Markus Oneal  Pt returning a missed call. Best contact [phone].

## 2019-10-22 RX ORDER — FLUOXETINE HYDROCHLORIDE 20 MG/1
CAPSULE ORAL
Qty: 90 CAP | Refills: 0 | Status: SHIPPED | OUTPATIENT
Start: 2019-10-22 | End: 2020-01-13

## 2019-11-17 DIAGNOSIS — G31.84 MCI (MILD COGNITIVE IMPAIRMENT): ICD-10-CM

## 2019-11-18 RX ORDER — DONEPEZIL HYDROCHLORIDE 10 MG/1
TABLET, FILM COATED ORAL
Qty: 90 TAB | Refills: 1 | Status: SHIPPED | OUTPATIENT
Start: 2019-11-18 | End: 2020-01-30 | Stop reason: SDUPTHER

## 2019-12-30 ENCOUNTER — TELEPHONE (OUTPATIENT)
Dept: NEUROLOGY | Age: 59
End: 2019-12-30

## 2019-12-30 NOTE — TELEPHONE ENCOUNTER
----- Message from Talita Tate sent at 12/30/2019  3:42 PM EST -----  Regarding: YULISSA Mckinley/Telephone  General Message/Vendor Calls    Caller's first and last name: Patrick Wyman (Sister)       Reason for call: Requesting a call back concerning pt's       Callback required yes/no and why:Yes      Best contact number(s):[phone]      Details to clarify the request:      Talita Tate

## 2020-01-02 NOTE — TELEPHONE ENCOUNTER
Patient sister is requesting a call back from [facility]. She is on the release of information, She wants to let you know about some changes that has been going on with the patient she does not want the patient to know.

## 2020-01-06 NOTE — TELEPHONE ENCOUNTER
Pt's sister wanted to let us know before his appt on the 9th that he has become more irritable, more forgetful, having a harder time focusing on things, has lost some weight. She is not sure of how to help but did not want us to mention her name to him that she has called to voice her concerns before he comes in. She wanted to make sure we were going to check his medications, she is not sure that he is taking everything that he is supposed but she doesn't really seem like she knows what he is supposed to be taking anyway. She just wanted to see if he would maybe need lab work done and to make sure we go over medication list with him which I explained we would at his visit. She is just concerned that he has declined and seems more unlike himself. But she did mention several times for us to not mention her name she doesn't want him to get upset that she had called.

## 2020-01-13 RX ORDER — FLUOXETINE HYDROCHLORIDE 20 MG/1
CAPSULE ORAL
Qty: 90 CAP | Refills: 0 | Status: SHIPPED | OUTPATIENT
Start: 2020-01-13 | End: 2020-01-30

## 2020-01-30 ENCOUNTER — OFFICE VISIT (OUTPATIENT)
Dept: NEUROLOGY | Age: 60
End: 2020-01-30

## 2020-01-30 VITALS
SYSTOLIC BLOOD PRESSURE: 142 MMHG | WEIGHT: 202 LBS | HEIGHT: 74 IN | DIASTOLIC BLOOD PRESSURE: 68 MMHG | RESPIRATION RATE: 20 BRPM | BODY MASS INDEX: 25.93 KG/M2

## 2020-01-30 DIAGNOSIS — G40.219 PARTIAL EPILEPSY WITH IMPAIRMENT OF CONSCIOUSNESS, INTRACTABLE (HCC): Primary | ICD-10-CM

## 2020-01-30 DIAGNOSIS — G31.84 MCI (MILD COGNITIVE IMPAIRMENT): ICD-10-CM

## 2020-01-30 DIAGNOSIS — Z96.89 STATUS POST VNS (VAGUS NERVE STIMULATOR) PLACEMENT: ICD-10-CM

## 2020-01-30 DIAGNOSIS — R63.4 WEIGHT LOSS: ICD-10-CM

## 2020-01-30 RX ORDER — DONEPEZIL HYDROCHLORIDE 10 MG/1
TABLET, FILM COATED ORAL
Qty: 90 TAB | Refills: 2 | Status: SHIPPED | OUTPATIENT
Start: 2020-01-30 | End: 2020-04-14

## 2020-01-30 RX ORDER — LACOSAMIDE 200 MG/1
TABLET ORAL
Qty: 360 TAB | Refills: 2 | Status: SHIPPED | OUTPATIENT
Start: 2020-01-30 | End: 2020-07-09 | Stop reason: SDUPTHER

## 2020-01-30 NOTE — PROGRESS NOTES
[address] [facility],5Th [address]  [address]   [address] [place] [address] [place] [address] [address]   [phone] Office   [phone] Fax           Date:  20     Name:  Royce Kramer  :  1960  MRN:  [id number]     PCP:  Anna Hernandez MD    Chief Complaint   Patient presents with    Epilepsy       HISTORY OF PRESENT ILLNESS: Follow up for epilepsy and MCI. He continues to take Briviact 150 mg twice daily and Vimpat 200 mg in the morning and at noon and then 400 mg at night. He indicates he has not had any side effects. Per the directions of his Vimpat, he was supposed to be taking 300 mg in the morning but found that this dose caused him to have issues with feeling tired and dizzy. He has not had any seizures despite the slight decrease in dose. With regard to his mild cognitive impairment, he continues to take Aricept 10 mg daily. His sister did call and left a message voicing some concerns about some increase in irritability, forgetfulness, difficulty focusing, and weight loss. She did not come with him to his visit today. While she did voice these concerns, she asked that we not let him know that she called so that this would not upset him. He was supposed to have his neuropsychological testing and this was rescheduled for 2019. Unfortunately, the patient did cancel this appointment and now has a his appointment next week on . He continues to live independently. He has not had any issues with driving or getting lost.  He has not had any issues with his finances. He denies having any significant issues with his memory outside of forgetting the things that he wants to forget. Recap from LOV:  MCI on Aricept. He was to have neurocognitive reassessment but this was never completed. However, he feels that he is doing better especially now that he is not on such a strong dose of Keppra.   We will try to have the neuropsychological evaluation rescheduled. With regard to his epilepsy, this is stable on present therapy of Briviact and Vimpat with far less sedation. VNS was interrogated and appears to be functioning properly with a battery of 50 to 75%. He will continue taking the Briviact and Vimpat as previously prescribed. Follow up in six months. Current Outpatient Medications   Medication Sig    donepezil (ARICEPT) 10 mg tablet TAKE 1 TABLET BY MOUTH  NIGHTLY    lacosamide (VIMPAT) 200 mg tab tablet Take 1.5 tablets QAM, 1 midday, and 2 at bedtime    brivaracetam (BRIVIACT) 100 mg tablet Take 1 Tab by mouth two (2) times a day. Max Daily Amount: 300 mg.    brivaracetam (BRIVIACT) 50 mg tablet Take 1 Tab by mouth two (2) times a day. Max Daily Amount: 300 mg.  ibuprofen (MOTRIN) 800 mg tablet Take 800 mg by mouth every eight (8) hours as needed for Pain.  tadalafil (CIALIS) 5 mg tablet Take 5 mg by mouth daily.  ALPRAZolam (XANAX) 0.25 mg tablet 1 po q12 hours prn anxiety--may cause drowsiness     No current facility-administered medications for this visit.       Allergies   Allergen Reactions    Tegretol [Carbamazepine] Rash     Past Medical History:   Diagnosis Date    Burning with urination     Hypertension     Seizures ([address]     Sleep apnea     Pt has machine but does not use    Unspecified sleep apnea      Past Surgical History:   Procedure Laterality Date    HX OTHER SURGICAL  2005    vagus nerve stimulator     Social History     Socioeconomic History    Marital status:      Spouse name: Not on file    Number of children: Not on file    Years of education: Not on file    Highest education level: Not on file   Occupational History    Not on file   Social Needs    Financial resource strain: Not on file    Food insecurity:     Worry: Not on file     Inability: Not on file    Transportation needs:     Medical: Not on file     Non-medical: Not on file   Tobacco Use  Smoking status: Former Smoker     Packs/day: 1.50     Years: 20.00     Pack years: 30.00     Last attempt to quit: 2001     Years since quittin.0    Smokeless tobacco: Former User   Substance and Sexual Activity    Alcohol use: No    Drug use: No    Sexual activity: Never   Lifestyle    Physical activity:     Days per week: Not on file     Minutes per session: Not on file    Stress: Not on file   Relationships    Social connections:     Talks on phone: Not on file     Gets together: Not on file     Attends [employer]'s witness service: Not on file     Active member of club or organization: Not on file     Attends meetings of clubs or organizations: Not on file     Relationship status: Not on file    Intimate partner violence:     Fear of current or ex partner: Not on file     Emotionally abused: Not on file     Physically abused: Not on file     Forced sexual activity: Not on file   Other Topics Concern     Service Not Asked    Blood Transfusions Not Asked    Caffeine Concern Not Asked    Occupational Exposure Not Asked   [employer] Hazards Not Asked    Sleep Concern Not Asked    Stress Concern Not Asked    Weight Concern Not Asked    Special Diet Not Asked    Back Care Not Asked    Exercise Not Asked    Bike Helmet Not Asked    [facility],[address] [facility] Not Asked    Self-Exams Not Asked   Social History Narrative    Not on file     Family History   Problem Relation Age of Onset    Heart Disease Maternal Uncle     Cancer Father     Heart Disease Maternal Uncle        PHYSICAL EXAMINATION:    Visit Vitals  Resp 20   Ht 6' 2\" (1.88 m)   Wt 91.6 kg (202 lb)   BMI 25.94 kg/m²     General:  Well defined, nourished, and groomed individual in no acute distress. Neck: Supple, nontender, no bruits, no pain with resistance to active range of motion. Heart: Regular rate and rhythm, no murmurs, rub, or gallop. Normal S1S2.   Lungs:  Clear to auscultation bilaterally with equal chest expansion, no cough, no wheeze  Musculoskeletal:  Extremities revealed no edema and had full range of motion of joints. Psych:  Good mood and bright affect    NEUROLOGICAL EXAMINATION:     Mental Status:   Alert and oriented to person, place, and time with recent and remote memory intact. Attention span and concentration are normal. Speech is fluent with a full fund     Cranial Nerves:  I: smell Not tested   II: visual fields Full to confrontation   II: pupils Equal, round, reactive to light   II: optic disc No papilledema   III,VII: ptosis none   III,IV,VI: extraocular muscles  Full ROM   V: mastication normal   V: facial light touch sensation  normal   VII: facial muscle function   symmetric   VIII: hearing symmetric   IX: soft palate elevation  normal   XI: trapezius strength  5/5   XI: sternocleidomastoid strength 5/5   XI: neck flexion strength  5/5   XII: tongue  midline     Motor Examination:               Normal tone, bulk, and strength, 5/5 muscle strength throughout.    Coordination:  Finger to nose and rapid arm movement testing was normal.   No resting or intention tremor  Gait and Station: [employer] while walking.  Normal arm swing.  No pronator drift.   No muscle wasting or fasiculations noted.    Reflexes:  DTRs 2+ throughout.       VNS interrogated and settings are as follows: Battery 50-75%  Output current: 2.25  Signal frequency: 30   Pulse width: 130  Signal on-time: 30 sec  Signal off-time: 5 min     Autostim   Output current  2.5  Pulse width at 130sec  On time  60 sec. Heart detection at 20%     Magnet current: 2.75  Magnet on-time: 60   Magnet pulse width: 130      Model AspireSR 106  Serial# [id number]  Implanted 2016-07-01    ASSESSMENT AND PLAN    ICD-10-CM ICD-9-CM    1.  Partial epilepsy with impairment of consciousness, intractable (HCC) G40.219 345.41 CBC WITH AUTOMATED DIFF      METABOLIC PANEL, COMPREHENSIVE      TSH 3RD GENERATION      LACOSAMIDE      lacosamide (VIMPAT) 200 mg tab tablet brivaracetam (BRIVIACT) 100 mg tablet      brivaracetam (BRIVIACT) 50 mg tablet      [place] ELEC BAYRON IMPLT NPGT PHYS/QHP W/O PROGRAMMING   2. MCI (mild cognitive impairment) G31.84 331.83 TSH 3RD GENERATION      donepeziL (ARICEPT) 10 mg tablet   3. Status post VNS (vagus nerve stimulator) placement Z96.89 V45.89 [place] ELEC BAYRON IMPLT NPGT PHYS/QHP W/O PROGRAMMING   4. Weight loss R63.4 783.21 TSH 3RD GENERATION     Epilepsy appears to be stable on present therapy of Briviact and Vimpat. He will continue taking this medication as previously prescribed. His vagus nerve stimulator was interrogated today and does appear to be working properly. Battery is noted to be at 75 to 100%. With regard to the concerns voiced by his sister regarding his cognitive impairment, he does not feel that he has any significant issues. Of course he does come to the office without anyone to corroborate his functional ability. In the past, he has had neurocognitive assessment which did demonstrate mild cognitive impairment. He has been on Aricept 10 mg daily for some time now and this was started even prior to his previous neuropsychiatric testing. For now, he will continue taking this medication without change. He does have repeat neuropsychiatric assessment scheduled for next week and he was encouraged to keep this appointment. To assess for some of the concerns regarding his weight loss, he was sent for a thyroid function test.  Follow-up in approximately 6 months or sooner if needed. Betzaida Bell

## 2020-02-07 LAB
ALBUMIN SERPL-MCNC: 4.3 G/DL (ref 3.8–4.9)
ALBUMIN/GLOB SERPL: 2 {RATIO} (ref 1.2–2.2)
ALP SERPL-CCNC: 76 IU/L (ref 39–117)
ALT SERPL-CCNC: 11 IU/L (ref 0–44)
AST SERPL-CCNC: 16 IU/L (ref 0–40)
BASOPHILS # BLD AUTO: 0 X10E3/UL (ref 0–0.2)
BASOPHILS NFR BLD AUTO: 1 %
BILIRUB SERPL-MCNC: 0.5 MG/DL (ref 0–1.2)
BUN SERPL-MCNC: 10 MG/DL (ref 6–24)
BUN/CREAT SERPL: 9 (ref 9–20)
CALCIUM SERPL-MCNC: 9.4 MG/DL (ref 8.7–10.2)
CHLORIDE SERPL-SCNC: 102 MMOL/L (ref 96–106)
CO2 SERPL-SCNC: 27 MMOL/L (ref 20–29)
CREAT SERPL-MCNC: 1.07 MG/DL (ref 0.76–1.27)
EOSINOPHIL # BLD AUTO: 0.1 X10E3/UL (ref 0–0.4)
EOSINOPHIL NFR BLD AUTO: 2 %
ERYTHROCYTE [DISTWIDTH] IN BLOOD BY AUTOMATED COUNT: 12.6 % (ref 11.6–15.4)
GLOBULIN SER CALC-MCNC: 2.1 G/DL (ref 1.5–4.5)
GLUCOSE SERPL-MCNC: 84 MG/DL (ref 65–99)
HCT VFR BLD AUTO: 49.4 % (ref 37.5–51)
HGB BLD-MCNC: 16.6 G/DL (ref 13–17.7)
IMM GRANULOCYTES # BLD AUTO: 0 X10E3/UL (ref 0–0.1)
IMM GRANULOCYTES NFR BLD AUTO: 0 %
LACOSAMIDE SERPL-MCNC: 26.4 UG/ML (ref 5–10)
LYMPHOCYTES # BLD AUTO: 1.1 X10E3/UL (ref 0.7–3.1)
LYMPHOCYTES NFR BLD AUTO: 24 %
MCH RBC QN AUTO: 29.1 PG (ref 26.6–33)
MCHC RBC AUTO-ENTMCNC: 33.6 G/DL (ref 31.5–35.7)
MCV RBC AUTO: 87 FL (ref 79–97)
MONOCYTES # BLD AUTO: 0.6 X10E3/UL (ref 0.1–0.9)
MONOCYTES NFR BLD AUTO: 13 %
NEUTROPHILS # BLD AUTO: 2.9 X10E3/UL (ref 1.4–7)
NEUTROPHILS NFR BLD AUTO: 60 %
PLATELET # BLD AUTO: 218 X10E3/UL (ref 150–450)
POTASSIUM SERPL-SCNC: 4.2 MMOL/L (ref 3.5–5.2)
PROT SERPL-MCNC: 6.4 G/DL (ref 6–8.5)
RBC # BLD AUTO: 5.71 X10E6/UL (ref 4.14–5.8)
SODIUM SERPL-SCNC: 143 MMOL/L (ref 134–144)
TSH SERPL DL<=0.005 MIU/L-ACNC: 1.99 UIU/ML (ref 0.45–4.5)
WBC # BLD AUTO: 4.8 X10E3/UL (ref 3.4–10.8)

## 2020-02-13 ENCOUNTER — TELEPHONE (OUTPATIENT)
Dept: NEUROLOGY | Age: 60
End: 2020-02-13

## 2020-02-13 DIAGNOSIS — G40.219 PARTIAL EPILEPSY WITH IMPAIRMENT OF CONSCIOUSNESS, INTRACTABLE (HCC): Primary | ICD-10-CM

## 2020-02-13 DIAGNOSIS — G40.219 PARTIAL EPILEPSY WITH IMPAIRMENT OF CONSCIOUSNESS, INTRACTABLE (HCC): ICD-10-CM

## 2020-02-13 NOTE — TELEPHONE ENCOUNTER
Sebastian Price came back from the  with a note stating patient came and picked up 1 sample of Briviact 50mg and 1 of Briviact 100mg.

## 2020-04-08 RX ORDER — FLUOXETINE HYDROCHLORIDE 20 MG/1
CAPSULE ORAL
Qty: 90 CAP | Refills: 0 | Status: SHIPPED | OUTPATIENT
Start: 2020-04-08 | End: 2020-07-02

## 2020-04-13 DIAGNOSIS — G31.84 MCI (MILD COGNITIVE IMPAIRMENT): ICD-10-CM

## 2020-04-14 RX ORDER — DONEPEZIL HYDROCHLORIDE 10 MG/1
TABLET, FILM COATED ORAL
Qty: 90 TAB | Refills: 2 | Status: SHIPPED | OUTPATIENT
Start: 2020-04-14 | End: 2020-07-09 | Stop reason: SDUPTHER

## 2020-07-02 RX ORDER — FLUOXETINE HYDROCHLORIDE 20 MG/1
CAPSULE ORAL
Qty: 90 CAP | Refills: 0 | Status: SHIPPED | OUTPATIENT
Start: 2020-07-02 | End: 2020-09-25

## 2020-07-09 ENCOUNTER — OFFICE VISIT (OUTPATIENT)
Dept: NEUROLOGY | Age: 60
End: 2020-07-09

## 2020-07-09 VITALS
HEART RATE: 57 BPM | SYSTOLIC BLOOD PRESSURE: 132 MMHG | OXYGEN SATURATION: 96 % | RESPIRATION RATE: 14 BRPM | HEIGHT: 74 IN | WEIGHT: 200 LBS | BODY MASS INDEX: 25.67 KG/M2 | TEMPERATURE: 98.4 F | DIASTOLIC BLOOD PRESSURE: 84 MMHG

## 2020-07-09 DIAGNOSIS — G31.84 MCI (MILD COGNITIVE IMPAIRMENT): ICD-10-CM

## 2020-07-09 DIAGNOSIS — G40.219 PARTIAL EPILEPSY WITH IMPAIRMENT OF CONSCIOUSNESS, INTRACTABLE (HCC): Primary | ICD-10-CM

## 2020-07-09 RX ORDER — LACOSAMIDE 200 MG/1
TABLET ORAL
Qty: 360 TAB | Refills: 2 | Status: SHIPPED | OUTPATIENT
Start: 2020-07-09 | End: 2020-09-17 | Stop reason: SDUPTHER

## 2020-07-09 RX ORDER — DONEPEZIL HYDROCHLORIDE 10 MG/1
TABLET, FILM COATED ORAL
Qty: 90 TAB | Refills: 2 | Status: SHIPPED | OUTPATIENT
Start: 2020-07-09 | End: 2020-12-21

## 2020-07-09 NOTE — PROGRESS NOTES
[address] [facility],5Th [address]  [address]   [address] [facility] [address] [facility]   [phone] Office   [phone] Fax           Date:  20     Name:  Simon De Luna  :  1960  MRN:  [id number]     PCP:  Wolfgang Amanda MD    Chief Complaint   Patient presents with    Epilepsy     yrs since last sz, needs DMV forms filled out       HISTORY OF PRESENT ILLNESS: Follow up for epilepsy and MCI. He continues to take Briviact 150 mg twice daily and Vimpat 200 mg in the morning and at noon and then 400 mg at night. He indicates he has not had any side effects or symptoms of toxicity. He has not had any seizures since his last office visit. He continues to have some mild cognitive impairment but does not think this is any worse than it has been. He indicates that his sister does not note noted order told him of any concerns regarding his cognitive function. He was supposed to have neurocognitive assessment but again failed to show for testing. Anxiety and depression is well managed on Prozac with as needed Xanax as needed for anxiety. He has not had any new medical diagnoses, medications, or surgeries since his last office visit. Recap from LOV:  Epilepsy appears to be stable on present therapy of Briviact and Vimpat. He will continue taking this medication as previously prescribed. His vagus nerve stimulator was interrogated today and does appear to be working properly. Battery is noted to be at 75 to 100%. With regard to the concerns voiced by his sister regarding his cognitive impairment, he does not feel that he has any significant issues. Of course he does come to the office without anyone to corroborate his functional ability. In the past, he has had neurocognitive assessment which did demonstrate mild cognitive impairment.   He has been on Aricept 10 mg daily for some time now and this was started even prior to his previous neuropsychiatric testing. For now, he will continue taking this medication without change. He does have repeat neuropsychiatric assessment scheduled for next week and he was encouraged to keep this appointment. To assess for some of the concerns regarding his weight loss, he was sent for a thyroid function test.  Follow-up in approximately 6 months or sooner if needed. Current Outpatient Medications   Medication Sig    donepeziL (ARICEPT) 10 mg tablet TAKE 1 TABLET BY MOUTH  NIGHTLY    brivaracetam (BRIVIACT) 50 mg tablet Take 1 Tab by mouth two (2) times a day. Max Daily Amount: 300 mg.    brivaracetam (BRIVIACT) 100 mg tablet Take 1 Tab by mouth two (2) times a day. Max Daily Amount: 300 mg.    lacosamide (VIMPAT) 200 mg tab tablet Take 1 tablet bid and 2 at bedtime    FLUoxetine (PROzac) 20 mg capsule TAKE 1 CAPSULE BY MOUTH  DAILY    ALPRAZolam (XANAX) 0.25 mg tablet 1 po q12 hours prn anxiety--may cause drowsiness    ibuprofen (MOTRIN) 800 mg tablet Take 800 mg by mouth every eight (8) hours as needed for Pain.  tadalafil (CIALIS) 5 mg tablet Take 5 mg by mouth daily. No current facility-administered medications for this visit.       Allergies   Allergen Reactions    Tegretol [Carbamazepine] Rash     Past Medical History:   Diagnosis Date    Burning with urination     Hypertension     Seizures ([address]     Sleep apnea     Pt has machine but does not use    Unspecified sleep apnea      Past Surgical History:   Procedure Laterality Date    HX OTHER SURGICAL  2005    vagus nerve stimulator     Social History     Socioeconomic History    Marital status:      Spouse name: Not on file    Number of children: Not on file    Years of education: Not on file    Highest education level: Not on file   Occupational History    Not on file   Social Needs    Financial resource strain: Not on file    Food insecurity     Worry: Not on file     Inability: Not on file   [address] [facility] [address] Transportation needs     Medical: Not on file     Non-medical: Not on file   Tobacco Use    Smoking status: Former Smoker     Packs/day: 1.50     Years: 20.00     Pack years: 30.00     Last attempt to quit: 2001     Years since quittin.4    Smokeless tobacco: Former User   Substance and Sexual Activity    Alcohol use: No    Drug use: No    Sexual activity: Never   Lifestyle    Physical activity     Days per week: Not on file     Minutes per session: Not on file    Stress: Not on file   Relationships    Social connections     Talks on phone: Not on file     Gets together: Not on file     Attends [employer] service: Not on file     Active member of club or organization: Not on file     Attends meetings of clubs or organizations: Not on file     Relationship status: Not on file    Intimate partner violence     Fear of current or ex partner: Not on file     Emotionally abused: Not on file     Physically abused: Not on file     Forced sexual activity: Not on file   Other Topics Concern     Service Not Asked    Blood Transfusions Not Asked    Caffeine Concern Not Asked    Occupational Exposure Not Asked    Hobby Hazards Not Asked    Sleep Concern Not Asked    Stress Concern Not Asked    Weight Concern Not Asked    Special Diet Not Asked    Back Care Not Asked    Exercise Not Asked    Bike Helmet Not Asked    [address] Not Asked    Self-Exams Not Asked   Social History Narrative    Not on file     Family History   Problem Relation Age of Onset    Heart Disease Maternal Uncle     Cancer Father     Heart Disease Maternal Uncle        PHYSICAL EXAMINATION:    Visit Vitals  /84 (BP 1 Location: Right arm, BP Patient Position: Sitting)   Pulse (!) 57   Temp 98.4 °F (36.9 °C)   Resp 14   Ht 6' 2\" (1.88 m)   Wt 90.7 kg (200 lb)   SpO2 96%   BMI 25.68 kg/m²     General:  Well defined, nourished, and groomed individual in no acute distress.     Neck: Supple, nontender, no bruits, no pain with resistance to active range of motion. Heart: Regular rate and rhythm, no murmurs, rub, or gallop. Normal S1S2. Lungs:  Clear to auscultation bilaterally with equal chest expansion, no cough, no wheeze  Musculoskeletal:  Extremities revealed no edema and had full range of motion of joints. Psych:  Good mood and bright affect    NEUROLOGICAL EXAMINATION:     Mental Status:   Alert and oriented to person, place, and time with recent and remote memory intact. Attention span and concentration are normal. Speech is fluent with a full fund     Cranial Nerves:  I: smell Not tested   II: visual fields Full to confrontation   II: pupils Equal, round, reactive to light   II: optic disc No papilledema   III,VII: ptosis none   III,IV,VI: extraocular muscles  Full ROM   V: mastication normal   V: facial light touch sensation  normal   VII: facial muscle function   symmetric   VIII: hearing symmetric   IX: soft palate elevation  normal   XI: trapezius strength  5/5   XI: sternocleidomastoid strength 5/5   XI: neck flexion strength  5/5   XII: tongue  midline     Motor Examination:               Normal tone, bulk, and strength, 5/5 muscle strength throughout.    Coordination:  Finger to nose and rapid arm movement testing was normal.   No resting or intention tremor  Gait and Station: [employer] while walking.  Normal arm swing.  No pronator drift.   No muscle wasting or fasiculations noted.    Reflexes:  DTRs 2+ throughout.       VNS interrogated and settings are as follows: Battery 50-75%  Output current: 2.25  Signal frequency: 30   Pulse width: 130  Signal on-time: 30 sec  Signal off-time: 5 min     Autostim   Output current  2.5  Pulse width at 130sec  On time  60 sec.    Heart detection at 20%     Magnet current: 2.75  Magnet on-time: 60   Magnet pulse width: 130      Model AspireSR 106  Serial# [id number]  Implanted 2016-07-01    Diagnostic testing of the system, magnet, and autostim demonstrated VNS is in proper working order. ASSESSMENT AND PLAN    ICD-10-CM ICD-9-CM    1. Partial epilepsy with impairment of consciousness, intractable (HCC)  G40.219 345.41 brivaracetam (Briviact) 50 mg tablet      brivaracetam (Briviact) 100 mg tablet      lacosamide (Vimpat) 200 mg tab tablet      [place] ELEC BAYRON IMPLT CPLX CN NPGT PRGRMG   2. MCI (mild cognitive impairment)  G31.84 331.83 donepeziL (ARICEPT) 10 mg tablet     Epilepsy is stable on present therapy of Briviact and Vimpat in addition to his vagus nerve stimulation. No signs or symptoms of toxicity or side effect. VNS was interrogated today with his battery appearing to be in the 50 to 75% range. Diagnostic testing of the system, magnet, and autostim showed that the device was in proper working order. DMV forms were filled out. Mild cognitive impairment is subjectively about the same. Will continue taking the Aricept 10 mg daily as previously prescribed. Follow-up in 1 year or sooner if needed. Betzaida Machuca

## 2020-09-14 DIAGNOSIS — G40.219 PARTIAL EPILEPSY WITH IMPAIRMENT OF CONSCIOUSNESS, INTRACTABLE (HCC): ICD-10-CM

## 2020-09-14 NOTE — TELEPHONE ENCOUNTER
----- Message from Benson Camarena sent at 9/14/2020  1:11 PM EDT -----  Regarding: NP Elías/refill  Medication Refill    Caller (if not patient): Pt      Relationship of caller (if not patient): Pt      Best contact number(s): [phone]      Name of medication and dosage if known: brivaracetam (Briviact) 100 mg tablet and brivaracetam (Briviact) 50 mg tablet      Is patient out of this medication (yes/no): Yes       Pharmacy name: [id number] listed in chart? (yes/no): Yes  Pharmacy phone number: N/A      Details to clarify the request:      Benson Camarena

## 2020-09-17 DIAGNOSIS — G40.219 PARTIAL EPILEPSY WITH IMPAIRMENT OF CONSCIOUSNESS, INTRACTABLE (HCC): ICD-10-CM

## 2020-09-17 NOTE — TELEPHONE ENCOUNTER
----- Message from Julia Hughes sent at 9/17/2020  3:32 PM EDT -----  Regarding: NP Elías/RX  General Message/Vendor Calls    Caller's first and last name: Pt       Reason for call: Stated the pharmacy wont let him get his refill until office contacts insurance company. Stated he will be out of medication tomorrow.        Callback required yes/no and why: Yes      Best contact number(s): [phone]      Details to clarify the request:      Julia Hughes

## 2020-09-18 RX ORDER — LACOSAMIDE 200 MG/1
TABLET ORAL
Qty: 360 TAB | Refills: 2 | Status: SHIPPED | OUTPATIENT
Start: 2020-09-18 | End: 2020-09-21 | Stop reason: SDUPTHER

## 2020-09-21 DIAGNOSIS — G40.219 PARTIAL EPILEPSY WITH IMPAIRMENT OF CONSCIOUSNESS, INTRACTABLE (HCC): ICD-10-CM

## 2020-09-21 RX ORDER — LACOSAMIDE 200 MG/1
TABLET ORAL
Qty: 360 TAB | Refills: 2 | Status: SHIPPED | OUTPATIENT
Start: 2020-09-21 | End: 2021-04-06

## 2020-09-25 RX ORDER — FLUOXETINE HYDROCHLORIDE 20 MG/1
CAPSULE ORAL
Qty: 90 CAP | Refills: 3 | Status: SHIPPED | OUTPATIENT
Start: 2020-09-25 | End: 2021-08-19

## 2020-12-20 DIAGNOSIS — G31.84 MCI (MILD COGNITIVE IMPAIRMENT): ICD-10-CM

## 2020-12-21 RX ORDER — DONEPEZIL HYDROCHLORIDE 10 MG/1
TABLET, FILM COATED ORAL
Qty: 90 TAB | Refills: 3 | Status: SHIPPED | OUTPATIENT
Start: 2020-12-21 | End: 2021-11-18

## 2021-06-07 DIAGNOSIS — G40.219 PARTIAL EPILEPSY WITH IMPAIRMENT OF CONSCIOUSNESS, INTRACTABLE (HCC): ICD-10-CM

## 2021-06-07 RX ORDER — LACOSAMIDE 200 MG/1
TABLET ORAL
Qty: 120 TABLET | Refills: 2 | Status: SHIPPED | OUTPATIENT
Start: 2021-06-07 | End: 2021-11-15

## 2021-06-30 ENCOUNTER — TELEPHONE (OUTPATIENT)
Dept: NEUROLOGY | Age: 61
End: 2021-06-30

## 2021-06-30 NOTE — TELEPHONE ENCOUNTER
Per pharmacist we need to do a quantity override for vimpat. Patient needs 200mg in the morning 200 mg in the afternoon and 400 mg at night.

## 2021-06-30 NOTE — TELEPHONE ENCOUNTER
----- Message from Maryleesandra Morton sent at 6/30/2021  3:19 PM EDT -----  Regarding: Any Mckinley/telephone  General Message/Vendor Calls    Caller's first and last name: Patient      Reason for call: states he was told two weeks ago that his Vimpat was Ok'd to be filled and he went to [facility] can they state they cant fill it . Pt will be out of his medication tomorrow and would like to have this filled so that he would not have a seizure.       Callback required yes/no and why: Yes      Best contact number(s): [phone]      Details to clarify the request:      Marylee Barefoot

## 2021-06-30 NOTE — TELEPHONE ENCOUNTER
Talked with patient he will  samples tomorrow. Pito Flowers has gone over with me what the patient needs and will do so with the patient.

## 2021-07-01 DIAGNOSIS — G40.219 PARTIAL EPILEPSY WITH IMPAIRMENT OF CONSCIOUSNESS, INTRACTABLE (HCC): Primary | ICD-10-CM

## 2021-07-01 RX ORDER — LACOSAMIDE 150 MG/1
TABLET ORAL
Qty: 56 TABLET | Refills: 0 | Status: SHIPPED | COMMUNITY
Start: 2021-07-01 | End: 2021-07-21 | Stop reason: SDUPTHER

## 2021-07-01 RX ORDER — LACOSAMIDE 50 MG/1
TABLET ORAL
Qty: 28 TABLET | Refills: 0 | Status: SHIPPED | COMMUNITY
Start: 2021-07-01 | End: 2021-07-21 | Stop reason: SDUPTHER

## 2021-07-01 RX ORDER — LACOSAMIDE 100 MG/1
TABLET ORAL
Qty: 14 TABLET | Refills: 0 | Status: SHIPPED | COMMUNITY
Start: 2021-07-01 | End: 2021-07-21 | Stop reason: SDUPTHER

## 2021-07-14 ENCOUNTER — TELEPHONE (OUTPATIENT)
Dept: NEUROLOGY | Age: 61
End: 2021-07-14

## 2021-07-14 NOTE — TELEPHONE ENCOUNTER
----- Message from [facility] sent at 7/14/2021  1:40 PM EDT -----  Regarding: YULISSA Mckinley/Telephone  Caller's first and last name and relationship (if not the patient):Pt  Best contact number(s):[phone]  Whose call is being returned:unknown  Details to clarify the request:Pt is calling back in regards to a medication.

## 2021-07-21 DIAGNOSIS — G40.219 PARTIAL EPILEPSY WITH IMPAIRMENT OF CONSCIOUSNESS, INTRACTABLE (HCC): ICD-10-CM

## 2021-07-21 RX ORDER — LACOSAMIDE 150 MG/1
TABLET ORAL
Qty: 56 TABLET | Refills: 0 | Status: SHIPPED | COMMUNITY
Start: 2021-07-21 | End: 2021-08-06

## 2021-07-21 RX ORDER — LACOSAMIDE 50 MG/1
TABLET ORAL
Qty: 28 TABLET | Refills: 0 | Status: SHIPPED | COMMUNITY
Start: 2021-07-21 | End: 2021-08-06

## 2021-07-21 RX ORDER — LACOSAMIDE 100 MG/1
TABLET ORAL
Qty: 14 TABLET | Refills: 0 | Status: SHIPPED | COMMUNITY
Start: 2021-07-21 | End: 2021-08-06

## 2021-07-28 ENCOUNTER — TELEPHONE (OUTPATIENT)
Dept: NEUROLOGY | Age: 61
End: 2021-07-28

## 2021-07-28 NOTE — TELEPHONE ENCOUNTER
----- Message from Anamariatwin Piper sent at 7/27/2021  4:17 PM EDT -----  Regarding: YULISSA Mckinley/ Telephone  Caller's first and last name: Self    Reason for call: PA status    Callback required yes/no and why: Yes, to check status of PA     Best contact number(s): [phone]    Details to clarify the request: Pt is stating that [facility] has requested a Prior auth for his \"Vimpat\" r/x, but pt has not yet heard back on the status of his refill. [facility] [phone]  Fax:  [phone]. Pt would like a call back to get update on when refill will be ready.

## 2021-07-30 NOTE — TELEPHONE ENCOUNTER
S/w with pharmacist at [address] who stated PA was needed pt pt's Vimpat as he has been out. Called [employer] to initiate urgent PA. S/w with Negin Serrano who s/w pharmacist Mary Tierney about this as Vimpat is formulary with this plan. She states it is a cost issue as pt is on 120 tabs per 30 days. She stated PHARMACIST OR TECH FROM [facility] NEEDS TO CALL THE PHARMACY HELP DESK [phone] AND SAY HE NEEDS OVERRIDE CODE FOR COST/QUANTITY. THIS HAS TO BE DONE BY THE PHARMACY, NOT THIS OFFICE AS THEY ARE NOT ALLOWED TO GIVE OUT CODES TO ANYONE OTHER THAN PHARMACY. Called [facility] and relayed this information. He states he will call now.

## 2021-08-02 DIAGNOSIS — G40.219 PARTIAL EPILEPSY WITH IMPAIRMENT OF CONSCIOUSNESS, INTRACTABLE (HCC): Primary | ICD-10-CM

## 2021-08-02 NOTE — TELEPHONE ENCOUNTER
Spoke with Rafa Cleveland RN about message on 7/28/21, received clarification of what was discussed with pharmacy ([address]), relayed what RN said to pt. Pt did not agree with nurse or writer. Pt still stating that doctor's office must call pharmacy to \"okay\" medication.

## 2021-08-03 NOTE — TELEPHONE ENCOUNTER
----- Message from Lucio Amaro sent at 8/3/2021 10:26 AM EDT -----  Regarding: Any Mckinley/Telephone  General Message/Vendor Calls    Caller's first and last name: Patient       Reason for call: Checking the status of the Vimpat Medication PA that is to be sent to [address] , pt is out of medication and been trying to obtain a refill for sometime now, pt would like to know if he can come by the office to  some samples of Vimpat.       Callback required yes/no and why: yes      Best contact number(s):[phone]      Details to clarify the request:      Lucio Amaro

## 2021-08-04 ENCOUNTER — TELEPHONE (OUTPATIENT)
Dept: NEUROLOGY | Age: 61
End: 2021-08-04

## 2021-08-04 DIAGNOSIS — G40.219 PARTIAL EPILEPSY WITH IMPAIRMENT OF CONSCIOUSNESS, INTRACTABLE (HCC): ICD-10-CM

## 2021-08-04 RX ORDER — LACOSAMIDE 100 MG/1
100 TABLET ORAL 2 TIMES DAILY
Qty: 42 TABLET | Refills: 0 | Status: SHIPPED | COMMUNITY
Start: 2021-08-04 | End: 2021-08-06

## 2021-08-04 RX ORDER — LACOSAMIDE 50 MG/1
50 TABLET ORAL 2 TIMES DAILY
Qty: 112 TABLET | Refills: 0 | Status: SHIPPED | COMMUNITY
Start: 2021-08-04 | End: 2021-08-06

## 2021-08-04 NOTE — TELEPHONE ENCOUNTER
Please notify pharmacy of dosage and strength to  Submit, the dosage they are currently submitting is being denied.

## 2021-08-04 NOTE — TELEPHONE ENCOUNTER
Please write a letter to insurance detailing specifically why pt needs to be on total of 800 mg Vimpat and 300 mg Briviact to submit to insurance.

## 2021-08-05 NOTE — TELEPHONE ENCOUNTER
Re VIMPAT 200MG SIG 1 TABLET AM 1 TAB IN PM AND 2 TAB AT BEDTIME = 800MG DAILY DOSE APPROVED VIA TELEPHONE    RE BRIVIACT 100MG TWICE DAILY    RE BRIVIACT 50MG TWICE DAILY    NOTIFIED [facility] AND [facility] REPORTED A PAID CLAIM AND THEY WILL NOTIFY PATIENT. REFERENCE # [id number] CM . PLEASE UPDATE AND  DOCUMENT MED CHART AS ABOVE.

## 2021-08-19 RX ORDER — FLUOXETINE HYDROCHLORIDE 20 MG/1
CAPSULE ORAL
Qty: 90 CAPSULE | Refills: 3 | Status: SHIPPED | OUTPATIENT
Start: 2021-08-19 | End: 2021-09-08

## 2021-09-08 ENCOUNTER — OFFICE VISIT (OUTPATIENT)
Dept: NEUROLOGY | Age: 61
End: 2021-09-08
Payer: COMMERCIAL

## 2021-09-08 VITALS
BODY MASS INDEX: 26.64 KG/M2 | SYSTOLIC BLOOD PRESSURE: 145 MMHG | HEIGHT: 74 IN | HEART RATE: 80 BPM | OXYGEN SATURATION: 97 % | RESPIRATION RATE: 16 BRPM | WEIGHT: 207.6 LBS | DIASTOLIC BLOOD PRESSURE: 91 MMHG

## 2021-09-08 DIAGNOSIS — G40.219 PARTIAL EPILEPSY WITH IMPAIRMENT OF CONSCIOUSNESS, INTRACTABLE (HCC): Primary | ICD-10-CM

## 2021-09-08 DIAGNOSIS — Z96.89 STATUS POST VNS (VAGUS NERVE STIMULATOR) PLACEMENT: ICD-10-CM

## 2021-09-08 DIAGNOSIS — G31.84 MCI (MILD COGNITIVE IMPAIRMENT): ICD-10-CM

## 2021-09-08 PROCEDURE — 95970 ALYS NPGT W/O PRGRMG: CPT | Performed by: NURSE PRACTITIONER

## 2021-09-08 PROCEDURE — 99214 OFFICE O/P EST MOD 30 MIN: CPT | Performed by: NURSE PRACTITIONER

## 2021-09-08 NOTE — PROGRESS NOTES
[address] [facility],5Th Floor  [address]   [place] [address] [place] [address] [address]   [phone] Office   [phone] Fax           Date:  21     Name:  Flaca Lincoln  :  1960  MRN:  [id number]     PCP:  Geraldine Steven MD    Chief Complaint   Patient presents with    Follow-up     epilepsy        HISTORY OF PRESENT ILLNESS: Follow up for epilepsy and MCI. He continues to take Briviact 150 mg twice daily and Vimpat 200 mg in the morning and at noon and then 400 mg at night. He indicates he has not had any side effects or symptoms of toxicity. He has not had any seizures since his last office visit. Cognitively, he is about the same. Anxiety and depression was well managed on Prozac with as needed Xanax as needed for anxiety but he is reporting that he is not taking these at this time. He has not had any new medical diagnoses, medications, or surgeries since his last office visit. Recap from LOV:  Epilepsy is stable on present therapy of Briviact and Vimpat in addition to his vagus nerve stimulation. No signs or symptoms of toxicity or side effect. VNS was interrogated today with his battery appearing to be in the 50 to 75% range. Diagnostic testing of the system, magnet, and autostim showed that the device was in proper working order. DMV forms were filled out. Mild cognitive impairment is subjectively about the same. Will continue taking the Aricept 10 mg daily as previously prescribed. Follow-up in 1 year or sooner if needed. Current Outpatient Medications   Medication Sig    brivaracetam (Briviact) 100 mg tablet Take 1 Tablet by mouth two (2) times a day. Max Daily Amount: 200 mg. (Patient taking differently: Take  by mouth.  Take along with 50 mg for 150 mg BID (max daily dose of 300 mg))    lacosamide (Vimpat) 200 mg tab tablet TAKE ONE TABLET BY MOUTH 2 TIMES A DAY AND 2 TABLETS AT BEDTIME (Patient taking differently: Take  by mouth. TAKE 200 MG (ONE TAB) BY MOUTH EVERY MORNING, 200 MG (ONE TAB) IN THE AFTERNOON,  MG (TWO TABS) AT BEDTIME. MAX DAILY DOSE 800 MG)    brivaracetam (Briviact) 50 mg tablet TAKE ONE TABLET BY MOUTH 2 TIMES A DAY Max Daily Amount: 300 mg. (Patient taking differently: Take  by mouth. Take along with 100 mg for 150 mg BID (max daily dose of 300 mg))    donepeziL (ARICEPT) 10 mg tablet TAKE 1 TABLET BY MOUTH IN  THE EVENING    ibuprofen (MOTRIN) 800 mg tablet Take 800 mg by mouth every eight (8) hours as needed for Pain.  tadalafil (CIALIS) 5 mg tablet Take 5 mg by mouth daily. No current facility-administered medications for this visit.      Allergies   Allergen Reactions    Tegretol [Carbamazepine] Rash     Past Medical History:   Diagnosis Date    Burning with urination     Hypertension     Seizures ([address]     Sleep apnea     Pt has machine but does not use    Unspecified sleep apnea      Past Surgical History:   Procedure Laterality Date    HX OTHER SURGICAL  2005    vagus nerve stimulator     Social History     Socioeconomic History    Marital status:      Spouse name: Not on file    Number of children: Not on file    Years of education: Not on file    Highest education level: Not on file   Occupational History    Not on file   Tobacco Use    Smoking status: Former Smoker     Packs/day: 1.50     Years: 20.00     Pack years: 30.00     Quit date: 2001     Years since quittin.6    Smokeless tobacco: Former User   Substance and Sexual Activity    Alcohol use: No    Drug use: No    Sexual activity: Never   Other Topics Concern     Service Not Asked    Blood Transfusions Not Asked    Caffeine Concern Not Asked    Occupational Exposure Not Asked    Hobby Hazards Not Asked    Sleep Concern Not Asked    Stress Concern Not Asked    Weight Concern Not Asked    Special Diet Not Asked    Back Care Not Asked    Exercise Not Asked    Bike Helmet Not Asked   [address] Not Asked    Self-Exams Not Asked   Social History Narrative    Not on file     Social Determinants of Health     Financial Resource Strain:     Difficulty of Paying Living Expenses:    Food Insecurity:     Worried About Running Out of Food in the Last Year:     [address] in the Last Year:    Transportation Needs:     Lack of Transportation (Medical):  Lack of Transportation (Non-Medical):    Physical Activity:     Days of Exercise per Week:     Minutes of Exercise per Session:    Stress:     Feeling of Stress :    Social Connections:     Frequency of Communication with Friends and Family:     Frequency of Social Gatherings with Friends and Family:     Attends [employer] Services:     Active Member of Clubs or Organizations:     Attends Club or Organization Meetings:     Marital Status:    Intimate Partner Violence:     Fear of Current or Ex-Partner:     Emotionally Abused:     Physically Abused:     Sexually Abused:      Family History   Problem Relation Age of Onset    Heart Disease Maternal Uncle     Cancer Father     Heart Disease Maternal Uncle        PHYSICAL EXAMINATION:    Visit Vitals  BP (!) 145/91 (BP 1 Location: Left upper arm, BP Patient Position: Sitting)   Pulse 80   Resp 16   Ht 6' 2\" (1.88 m)   Wt 94.2 kg (207 lb 9.6 oz)   SpO2 97%   BMI 26.65 kg/m²     General:  Well defined, nourished, and groomed individual in no acute distress. Neck: Supple, nontender, no bruits, no pain with resistance to active range of motion. Heart: Regular rate and rhythm, no murmurs, rub, or gallop. Normal S1S2. Lungs:  Clear to auscultation bilaterally with equal chest expansion, no cough, no wheeze  Musculoskeletal:  Extremities revealed no edema and had full range of motion of joints.     Psych:  Good mood and bright affect    NEUROLOGICAL EXAMINATION:     Mental Status:   Alert and oriented to person, place, and time with recent and remote memory intact. Attention span and concentration are normal. Speech is fluent with a full fund     Cranial Nerves:  I: smell Not tested   II: visual fields Full to confrontation   II: pupils Equal, round, reactive to light   II: optic disc No papilledema   III,VII: ptosis none   III,IV,VI: extraocular muscles  Full ROM   V: mastication normal   V: facial light touch sensation  normal   VII: facial muscle function   symmetric   VIII: hearing symmetric   IX: soft palate elevation  normal   XI: trapezius strength  5/5   XI: sternocleidomastoid strength 5/5   XI: neck flexion strength  5/5   XII: tongue  midline     Motor Examination:               Normal tone, bulk, and strength, 5/5 muscle strength throughout.    Coordination:  Finger to nose and rapid arm movement testing was normal.   No resting or intention tremor  Gait and Station: Simple-Fill while walking.  Normal arm swing.  No pronator drift.   No muscle wasting or fasiculations noted.    Reflexes:  DTRs 2+ throughout.       VNS interrogated and settings are as follows: Battery 25-50%  Output current: 2.25  Signal frequency: 30   Pulse width: 130  Signal on-time: 30 sec  Signal off-time: 5 min     Autostim   Output current  2.5  Pulse width at 130sec  On time  60 sec. Heart detection at 20%     Magnet current: 2.75  Magnet on-time: 60   Magnet pulse width: 130      Model AspireSR 106  Serial# [id number]  Implanted 2016-07-01    Diagnostic testing of the system, magnet, and autostim demonstrated VNS is in proper working order. ASSESSMENT AND PLAN    ICD-10-CM ICD-9-CM    1. Partial epilepsy with impairment of consciousness, intractable ([facility] [address]  G40.219 345.41    2. Status post VNS (vagus nerve stimulator) placement  Z96.89 V45.89    3.  MCI (mild cognitive impairment)  G31.84 331.83      Epilepsy which is presently stable on Briviact 150 mg twice daily and Vimpat 200 mg twice daily and 400 mg at bedtime without signs or symptoms of side effect or toxicity. He has been stable on this regimen in combination with his vagus nerve stimulation for the last 3 years. Unfortunately, each year there seems to be an issue with getting his medications prescribed at these doses and a prior authorization with a quantity override is necessary in order to keep him on medication he takes. While these doses are higher than usual, his epilepsy is stable and lowering the doses would place him at significant risk for breakthrough seizure. Vagus nerve stimulator was interrogated today and does appear to be working properly. The battery appear to be at 25 to 50%, closer to the 50%, though will likely need to be replaced sometime in the next year to year and a half. Mild cognitive impairment for which she has been on Aricept. He was to have repeat neurocognitive assessmentIn February, 2020, but did not show for that appointment. At this point, he does not feel that his memory is any worse than it has been in the past.  Anxiety and depression are not an issue despite being off the Prozac and Xanax at this point. Follow-up yearly or sooner if needed. Kara A. Dannis Phoenix

## 2021-09-23 DIAGNOSIS — G40.219 PARTIAL EPILEPSY WITH IMPAIRMENT OF CONSCIOUSNESS, INTRACTABLE (HCC): ICD-10-CM

## 2021-09-23 NOTE — TELEPHONE ENCOUNTER
Caller (if not patient):       Relationship of caller (if not patient):       Best contact number(s):[phone]       Name of medication and dosage if known:brivaracetam (Briviact) 100 mg tablet [[id number]]         Is patient out of this medication (yes/no):yes       Pharmacy name:[facility] [address] listed in chart? (yes/no):yes   Pharmacy phone number:[phone]       Details to clarify the request:patient is out of medication and needs it filled by tomorrow

## 2021-09-24 DIAGNOSIS — G40.219 PARTIAL EPILEPSY WITH IMPAIRMENT OF CONSCIOUSNESS, INTRACTABLE (HCC): ICD-10-CM

## 2021-09-24 NOTE — TELEPHONE ENCOUNTER
Pt walked into the clinic to request his refills as he is out today. Outgoing call to [facility]. VO Refill Briviact 50 mg and Briviact 100 mg // 30 day supply with 2 refills.

## 2021-09-24 NOTE — TELEPHONE ENCOUNTER
----- Message from Duane Quigley sent at 9/24/2021  3:31 PM EDT -----  Regarding: Toby/refill  Medication Refill    Caller (if not patient):DAQUAN ANDRADE       Relationship of caller (if not patient):self      Best contact number(s):[phone]      Name of medication and dosage if known: 1) \"brivaracetam (Briviact) 50 mg tablet \"                                                                                               2) \"brivaracetam (Briviact) 100 mg tablet\"  Is patient out of this medication (yes/no): yes      Pharmacy name: [id number] listed in chart? (yes/no): N/A  Pharmacy phone number:  [phone]        Details to clarify the request:  Pt called to request of two refill medications , \"brivaracetam (Briviact) 50 mg tablet \"    \"brivaracetam (Briviact) 100 mg tablet\".       Duane Quigley

## 2021-11-12 DIAGNOSIS — G40.219 PARTIAL EPILEPSY WITH IMPAIRMENT OF CONSCIOUSNESS, INTRACTABLE (HCC): ICD-10-CM

## 2021-11-15 RX ORDER — LACOSAMIDE 200 MG/1
TABLET ORAL
Qty: 120 TABLET | Refills: 3 | Status: SHIPPED | OUTPATIENT
Start: 2021-11-15 | End: 2022-03-12

## 2021-11-17 DIAGNOSIS — G31.84 MCI (MILD COGNITIVE IMPAIRMENT): ICD-10-CM

## 2021-11-18 RX ORDER — DONEPEZIL HYDROCHLORIDE 10 MG/1
TABLET, FILM COATED ORAL
Qty: 90 TABLET | Refills: 3 | Status: SHIPPED | OUTPATIENT
Start: 2021-11-18

## 2022-03-11 ENCOUNTER — TELEPHONE (OUTPATIENT)
Dept: NEUROLOGY | Age: 62
End: 2022-03-11

## 2022-03-11 NOTE — TELEPHONE ENCOUNTER
Patient was calling for his VIMPAT medication, he needs this for over the weekend. Please send prescription to pharmacy.

## 2022-03-19 PROBLEM — G31.84 MCI (MILD COGNITIVE IMPAIRMENT): Status: ACTIVE | Noted: 2017-08-22

## 2022-04-14 DIAGNOSIS — G40.219 PARTIAL EPILEPSY WITH IMPAIRMENT OF CONSCIOUSNESS, INTRACTABLE (HCC): ICD-10-CM

## 2022-05-13 DIAGNOSIS — G40.219 PARTIAL EPILEPSY WITH IMPAIRMENT OF CONSCIOUSNESS, INTRACTABLE (HCC): ICD-10-CM

## 2022-06-10 DIAGNOSIS — G40.219 PARTIAL EPILEPSY WITH IMPAIRMENT OF CONSCIOUSNESS, INTRACTABLE (HCC): ICD-10-CM

## 2022-07-07 ENCOUNTER — TELEPHONE (OUTPATIENT)
Dept: NEUROLOGY | Age: 62
End: 2022-07-07

## 2022-07-07 NOTE — TELEPHONE ENCOUNTER
Patient was last seen over 6 months ago, he will need a revisit before paperwork can be filled out. He also never signed the paperwork giving us permission to fill it out.

## 2022-07-07 NOTE — TELEPHONE ENCOUNTER
Returned his call, advised him he needed to have an office visit to have DMV paperwork filled out. He is scheduled for Monday morning July 11th with Dr. Amrik Patterson. Paperwork given to his nurse.

## 2022-07-07 NOTE — TELEPHONE ENCOUNTER
Patient calling stating he missed a call from the office. He's waiting on paperwork from the doctor to give to [facility]. Please gisella so he can come and  the paperwork.

## 2022-07-11 ENCOUNTER — OFFICE VISIT (OUTPATIENT)
Dept: NEUROLOGY | Age: 62
End: 2022-07-11
Payer: COMMERCIAL

## 2022-07-11 VITALS
OXYGEN SATURATION: 97 % | SYSTOLIC BLOOD PRESSURE: 143 MMHG | RESPIRATION RATE: 14 BRPM | WEIGHT: 209 LBS | HEART RATE: 63 BPM | BODY MASS INDEX: 26.83 KG/M2 | DIASTOLIC BLOOD PRESSURE: 89 MMHG

## 2022-07-11 DIAGNOSIS — G40.219 PARTIAL EPILEPSY WITH IMPAIRMENT OF CONSCIOUSNESS, INTRACTABLE (HCC): ICD-10-CM

## 2022-07-11 DIAGNOSIS — G31.84 MCI (MILD COGNITIVE IMPAIRMENT): Primary | ICD-10-CM

## 2022-07-11 DIAGNOSIS — Z96.89 STATUS POST VNS (VAGUS NERVE STIMULATOR) PLACEMENT: ICD-10-CM

## 2022-07-11 PROCEDURE — 95970 ALYS NPGT W/O PRGRMG: CPT | Performed by: PSYCHIATRY & NEUROLOGY

## 2022-07-11 PROCEDURE — 99214 OFFICE O/P EST MOD 30 MIN: CPT | Performed by: PSYCHIATRY & NEUROLOGY

## 2022-07-11 RX ORDER — LACOSAMIDE 200 MG/1
TABLET ORAL
Qty: 120 TABLET | Refills: 5 | Status: SHIPPED | OUTPATIENT
Start: 2022-07-11

## 2022-07-11 NOTE — PROGRESS NOTES
[facility] and  [address] at [facility] at [address] [facility] [address], [address] [facility] ([place], [place]) [address] [facility], [address] [place] [address]   [phone]              Chief Complaint   Patient presents with    Follow-up     Current Outpatient Medications   Medication Sig Dispense Refill    brivaracetam (Briviact) 50 mg tablet TAKE ONE TABLET BY MOUTH 2 TIMES A DAY Max Daily Amount: 300 mg. 60 Tablet 0    brivaracetam (Briviact) 100 mg tablet TAKE ONE TABLET BY MOUTH 2 TIMES A DAY Max Daily Amount: 300 mg. 60 Tablet 0    lacosamide (Vimpat) 200 mg tab tablet TAKE ONE TABLET BY MOUTH 2 TIMES A DAY AND 2 TABLETS AT BEDTIME 120 Tablet 5    ibuprofen (MOTRIN) 800 mg tablet Take 800 mg by mouth every eight (8) hours as needed for Pain.  donepeziL (ARICEPT) 10 mg tablet TAKE 1 TABLET BY MOUTH IN  THE EVENING (Patient not taking: Reported on 2022) 90 Tablet 3    tadalafil (CIALIS) 5 mg tablet Take 5 mg by mouth daily. (Patient not taking: Reported on 2022)        Allergies   Allergen Reactions    Tegretol [Carbamazepine] Rash     Social History     Tobacco Use    Smoking status: Former Smoker     Packs/day: 1.50     Years: 20.00     Pack years: 30.00     Quit date: 2001     Years since quittin.4    Smokeless tobacco: Former User   Substance Use Topics    Alcohol use: No    Drug use: No   80-year-old gentleman returns today for follow-up epilepsy. His last visit was with nurse practitioner Jennifer Gaviria back in September. He is maintained on Briviact 150 mg twice daily and Vimpat 200 mg/200 mg / 400 mg  Vagus nerve stimulator as well. He also has mild cognitive impairment and has been on Aricept. He has history of anxiety and depression and has been on Prozac and Xanax as well. Today he reports no seizure. No occult seizure symptom. Tolerating medicine without difficulty.   He has had some difficulty with getting refills on his medicine and it sounds like when he runs out of the initial 5 refills the pharmacy sends refills requested but with only 1 refill so I am sending 5 refills today and I have asked for him to have the pharmacy request 5 refills when he needs them. He is not using the Aricept anymore. Denies any memory difficulty. Tolerates vagus nerve stimulator although he does note at times when it goes off it affects his voice but he tolerates that side effect. Examination  Visit Vitals  BP (!) 155/98 (BP 1 Location: Left arm, BP Patient Position: Sitting, BP Cuff Size: Adult)   Pulse 62   Resp 14   Wt 94.8 kg (209 lb)   SpO2 97%   BMI 26.83 kg/m²   Pleasant gentleman. He is awake alert and oriented. He has normal speech and normal language. Normal cognition to discussion of current events and medical history. No nystagmus. No ataxia. Vagus nerve stimulator interrogated today. All parameters working normally. Battery life 25-50%      Impression/Plan  Partial epilepsy controlled  Continue our current antiseizure medication regimen which is:  Briviact 150 mg twice daily  Vimpat 200/200/400  Vagus nerve stimulation  Monitor for battery life and generator replacement  DMV paperwork completed today    Mild cognitive impairment stable  Monitor off medication for now    Follow-up in 6 months        Rowdy Keith MD        This note was created using voice recognition software. Despite editing, there may be syntax errors.

## 2022-08-03 ENCOUNTER — TELEPHONE (OUTPATIENT)
Dept: NEUROLOGY | Age: 62
End: 2022-08-03

## 2022-08-03 NOTE — Clinical Note
NOTIFICATION RETURN TO WORK / SCHOOL    8/16/2022 9:52 AM    Mr. Lupe Stewart  [address] [address] [place] [address]      To Whom It May Concern:    Lupe Stewart is currently under the care of [address] [facility]. He will return to work/school on: ***    If there are questions or concerns please have the patient contact our office.         Sincerely,      Raj Leo MD

## 2022-08-03 NOTE — LETTER
8/16/2022 9:54 AM    Mr. Cheema Luna  [address] [facility] [address]    Mr. Javier Moreira is currently being treated by the [facility] for epilepsy. Last DMV form filled out for Mr. Javier Moreira stated his last seizure event was 2011 as this was reflected by previous progress notes. The DMV form written in 2020 stated last known seizure was in 2012. This was reported by Mr. Javier Moreira at that time because he could not remember exact year since it had been at least 8 years and he could not recall exact date. His progress notes indicated last known seizure was in 2011. Please update Mr. Javier Moreira record and reinstate his licence asap as either way, he has not had a seizure event for at least 10 years. If you need further documentation please fax request to [phone].           Sincerely,      Sarah Dueñas MD

## 2022-08-03 NOTE — TELEPHONE ENCOUNTER
Patient calling about DMV paperwork that was dropped off at the office last Monday. No one has called to discuss that as of today and DMV is calling him about the paperwork. Please call.

## 2022-08-04 NOTE — TELEPHONE ENCOUNTER
Called patient to inform that DMV form was faxed to [facility] 7/11/2022 and had received confirmation but unable to LM due to voicemail being full.

## 2022-08-04 NOTE — TELEPHONE ENCOUNTER
Pt called back, says he is referencing anew form bc the first one was not filled out correctly. Please call back.

## 2022-08-08 ENCOUNTER — TELEPHONE (OUTPATIENT)
Dept: NEUROLOGY | Age: 62
End: 2022-08-08

## 2022-08-08 NOTE — TELEPHONE ENCOUNTER
Patient would like a call from the nurse or doctor to explain why his license is suspended now? He stated he thought everything was fine the last time he saw Dr. Graciela Chappell 3 weeks ago. The [facility] sent him a letter stating it's suspended. The patient  is very upset and would like to know what is the next step? Patient can not work without his license.     Please contact

## 2022-08-10 NOTE — TELEPHONE ENCOUNTER
Attempted to call pt twice, both times phone went straight to . MB full so unable to LVM.   DMV form was revised with correct dates to reflect that pt's LOV was 7/11/22, NOT 7/11/21 and refaxed to [facility] with note requesting they reinstate DL ASAP

## 2022-08-16 NOTE — TELEPHONE ENCOUNTER
Letter written and faxed to [facility]. \"Mr. Chiara Huizar is currently being treated by the [address] [place] [address] Neurology [facility] for epilepsy. Last DMV form filled out for Mr. Chiara Huizar stated his last seizure event was 2011 as this was reflected by previous progress notes. The DMV form written in 2020 stated last known seizure was in 2012. This was reported by Mr. Chiara Huizar at that time because he could not remember exact year since it had been at least 8 years and he could not recall exact date. His progress notes indicated last known seizure was in 2011. Please update Mr. Chiara Huizar record and reinstate his licence asap as either way, he has not had a seizure event for at least 10 years. If you need further documentation please fax request to [phone]. \"

## 2022-10-19 DIAGNOSIS — G40.219 PARTIAL EPILEPSY WITH IMPAIRMENT OF CONSCIOUSNESS, INTRACTABLE (HCC): ICD-10-CM

## 2022-10-19 NOTE — TELEPHONE ENCOUNTER
Patient would like a call back from the nurse regarding the paperwork that was sent over to the [facility] pharmacy.       from [facility] pharmacy- Celia Merlos [phone]    Please contact

## 2022-10-19 NOTE — TELEPHONE ENCOUNTER
Called pt, notified that I attempted to reach \"[facility]\" multiple times and received busy signal.  Pt states this occurs when he calls as well. He is going to contact his HR dept as this is pharmacy that his work is recommending employees use.   Once he gets a good contact number, will let us know

## 2022-11-14 RX ORDER — LACOSAMIDE 200 MG/1
TABLET ORAL
Qty: 360 TABLET | Refills: 1 | Status: SHIPPED | OUTPATIENT
Start: 2022-11-14

## 2022-12-05 ENCOUNTER — TELEPHONE (OUTPATIENT)
Dept: NEUROLOGY | Age: 62
End: 2022-12-05

## 2022-12-05 NOTE — TELEPHONE ENCOUNTER
Vladimir Corbett calling stating they are unable to fill the patient medication (Vimpat 200 mg ) in general.    The other medication is (Briviact 100 mg)    He stated the pharmacy needs to send over presciptions to the patient local pharmacy. Please advise if asst is needed.

## 2023-03-04 DIAGNOSIS — G40.219 PARTIAL EPILEPSY WITH IMPAIRMENT OF CONSCIOUSNESS, INTRACTABLE (HCC): ICD-10-CM

## 2023-03-08 RX ORDER — LACOSAMIDE 200 MG/1
TABLET ORAL
Qty: 120 TABLET | Refills: 3 | Status: SHIPPED | OUTPATIENT
Start: 2023-03-08

## 2023-03-08 NOTE — TELEPHONE ENCOUNTER
Pt came in and states his medication for Vimpat is almost out, has a day left and needs a refill. Pharmacy has been reaching out but it hasn't been filled yet. Please call call pt when it is sent over to pharmacy.

## 2023-07-07 DIAGNOSIS — G40.219 LOCALIZATION-RELATED (FOCAL) (PARTIAL) SYMPTOMATIC EPILEPSY AND EPILEPTIC SYNDROMES WITH COMPLEX PARTIAL SEIZURES, INTRACTABLE, WITHOUT STATUS EPILEPTICUS (HCC): Primary | ICD-10-CM

## 2023-07-07 RX ORDER — LACOSAMIDE 200 MG/1
TABLET ORAL
Qty: 120 TABLET | Refills: 4 | Status: SHIPPED | OUTPATIENT
Start: 2023-07-07 | End: 2023-08-06

## 2023-07-07 RX ORDER — BRIVARACETAM 50 MG/1
TABLET, FILM COATED ORAL
Qty: 60 TABLET | Refills: 4 | Status: SHIPPED | OUTPATIENT
Start: 2023-07-07 | End: 2023-08-06

## 2023-07-07 RX ORDER — BRIVARACETAM 100 MG/1
TABLET, FILM COATED ORAL
Qty: 60 TABLET | Refills: 4 | Status: SHIPPED | OUTPATIENT
Start: 2023-07-07 | End: 2023-08-06

## 2023-10-26 ENCOUNTER — TELEPHONE (OUTPATIENT)
Age: 63
End: 2023-10-26

## 2023-10-26 NOTE — TELEPHONE ENCOUNTER
Briviact 100 mg PA initiated via [employer] prior auth form. Lacosamide 200 mg PA initiated via [employer] PA form. Faxed both along with LOV note. Stated pt had been on this since 2020, but they want 3 failed formularies and pt has only had 2.     Pt scheduled for f/u appt on 11/20/23 at 1415

## 2023-11-13 ENCOUNTER — OFFICE VISIT (OUTPATIENT)
Age: 63
End: 2023-11-13
Payer: COMMERCIAL

## 2023-11-13 VITALS
RESPIRATION RATE: 16 BRPM | SYSTOLIC BLOOD PRESSURE: 151 MMHG | OXYGEN SATURATION: 96 % | DIASTOLIC BLOOD PRESSURE: 89 MMHG | HEART RATE: 79 BPM

## 2023-11-13 DIAGNOSIS — G31.84 MCI (MILD COGNITIVE IMPAIRMENT): ICD-10-CM

## 2023-11-13 DIAGNOSIS — G40.219 LOCALIZATION-RELATED (FOCAL) (PARTIAL) SYMPTOMATIC EPILEPSY AND EPILEPTIC SYNDROMES WITH COMPLEX PARTIAL SEIZURES, INTRACTABLE, WITHOUT STATUS EPILEPTICUS (HCC): Primary | ICD-10-CM

## 2023-11-13 PROCEDURE — 99214 OFFICE O/P EST MOD 30 MIN: CPT | Performed by: PSYCHIATRY & NEUROLOGY

## 2023-11-13 PROCEDURE — 95970 ALYS NPGT W/O PRGRMG: CPT | Performed by: PSYCHIATRY & NEUROLOGY

## 2023-11-13 RX ORDER — LACOSAMIDE 200 MG/1
TABLET ORAL
Qty: 120 TABLET | Refills: 5 | Status: SHIPPED | OUTPATIENT
Start: 2023-11-13 | End: 2024-11-04

## 2023-12-04 ENCOUNTER — OFFICE VISIT (OUTPATIENT)
Age: 63
End: 2023-12-04
Payer: COMMERCIAL

## 2023-12-04 VITALS
WEIGHT: 204.4 LBS | OXYGEN SATURATION: 96 % | HEIGHT: 75 IN | DIASTOLIC BLOOD PRESSURE: 72 MMHG | TEMPERATURE: 97.3 F | HEART RATE: 82 BPM | SYSTOLIC BLOOD PRESSURE: 131 MMHG | BODY MASS INDEX: 25.41 KG/M2 | RESPIRATION RATE: 18 BRPM

## 2023-12-04 DIAGNOSIS — G40.219 LOCALIZATION-RELATED (FOCAL) (PARTIAL) SYMPTOMATIC EPILEPSY AND EPILEPTIC SYNDROMES WITH COMPLEX PARTIAL SEIZURES, INTRACTABLE, WITHOUT STATUS EPILEPTICUS (HCC): Primary | ICD-10-CM

## 2023-12-04 PROCEDURE — 99203 OFFICE O/P NEW LOW 30 MIN: CPT | Performed by: SURGERY

## 2023-12-05 ENCOUNTER — PREP FOR PROCEDURE (OUTPATIENT)
Age: 63
End: 2023-12-05

## 2023-12-05 DIAGNOSIS — G40.219 SEIZURE DISORDER, TEMPORAL LOBE, INTRACTABLE (HCC): ICD-10-CM

## 2023-12-15 NOTE — H&P (VIEW-ONLY)
To: Dr. Andrews  CC: Tim Angelo MD    From: Eric Cruz MD    Thank you for sending Pollo Mendoza to see us.     Please note that this dictation was completed with [employer], the computer voice recognition software.  Quite often unanticipated grammatical, syntax, homophones, and other interpretive errors are inadvertently transcribed by the software.  Please disregard these errors.  Please excuse any errors that have escaped final proofreading.      Encounter Date: 12/4/2023  History and Physical    Assessment:   Seizure disorder with VNS in place with low battery life    Body mass index is 25.55 kg/m²..    No anticoagulation.    Plan/Recommendations/Medical Decision Making:   VNS generator replacement.      Indications for vagal nerve stimulator are determined by the referring neurologist.  Potential benefits have been outlined by that provider.      Operative risks include bleeding, infection, chronic pain, and injury to structures in the region of surgery.  Injury to the vagus nerve can result in paralysis of the vocal cord on that side with hoarseness of speech which could be temporary or permanent.  Some patients experience difficulty swallowing and/or pain with swallowing.  Other structures at risk in the neck would include the carotid artery, jugular vein, and other cranial and cervical nerves.  There are also the inherent risks associated with general anesthesia.  These can be discussed further with the anesthesiologist.      Other risks of surgery include failure to achieve any benefit from the vagus nerve stimulation, failure of the device.  One third of individuals who received a vagus nerve stimulator do not get any benefit in terms of reduction of seizure frequency.    HPI:   Patient is a 63 y.o. male with history of     ICD-10-CM    1. Localization-related (focal) (partial) symptomatic epilepsy and epileptic syndromes with complex partial seizures, intractable, without status epilepticus

## 2023-12-29 ENCOUNTER — HOSPITAL ENCOUNTER (OUTPATIENT)
Facility: HOSPITAL | Age: 63
End: 2023-12-29
Attending: SURGERY
Payer: COMMERCIAL

## 2023-12-29 ENCOUNTER — HOSPITAL ENCOUNTER (OUTPATIENT)
Facility: HOSPITAL | Age: 63
Discharge: HOME | End: 2023-12-29
Payer: COMMERCIAL

## 2023-12-29 VITALS
WEIGHT: 208.78 LBS | BODY MASS INDEX: 27.67 KG/M2 | HEIGHT: 73 IN | RESPIRATION RATE: 18 BRPM | DIASTOLIC BLOOD PRESSURE: 93 MMHG | HEART RATE: 64 BPM | OXYGEN SATURATION: 99 % | SYSTOLIC BLOOD PRESSURE: 138 MMHG | TEMPERATURE: 97.8 F

## 2023-12-29 LAB
ANION GAP SERPL CALC-SCNC: 5 MMOL/L (ref 5–15)
BUN SERPL-MCNC: 15 MG/DL (ref 6–20)
BUN/CREAT SERPL: 11 (ref 12–20)
CALCIUM SERPL-MCNC: 9.1 MG/DL (ref 8.5–10.1)
CHLORIDE SERPL-SCNC: 106 MMOL/L (ref 97–108)
CO2 SERPL-SCNC: 27 MMOL/L (ref 21–32)
CREAT SERPL-MCNC: 1.4 MG/DL (ref 0.7–1.3)
ERYTHROCYTE [DISTWIDTH] IN BLOOD BY AUTOMATED COUNT: 13.2 % (ref 11.5–14.5)
GLUCOSE SERPL-MCNC: 110 MG/DL (ref 65–100)
HCT VFR BLD AUTO: 50.1 % (ref 36.6–50.3)
HGB BLD-MCNC: 16.5 G/DL (ref 12.1–17)
MCH RBC QN AUTO: 29.1 PG (ref 26–34)
MCHC RBC AUTO-ENTMCNC: 32.9 G/DL (ref 30–36.5)
MCV RBC AUTO: 88.4 FL (ref 80–99)
NRBC # BLD: 0 K/UL (ref 0–0.01)
NRBC BLD-RTO: 0 PER 100 WBC
PLATELET # BLD AUTO: 153 K/UL (ref 150–400)
PMV BLD AUTO: 9.9 FL (ref 8.9–12.9)
POTASSIUM SERPL-SCNC: 4.1 MMOL/L (ref 3.5–5.1)
RBC # BLD AUTO: 5.67 M/UL (ref 4.1–5.7)
SODIUM SERPL-SCNC: 138 MMOL/L (ref 136–145)
WBC # BLD AUTO: 5.2 K/UL (ref 4.1–11.1)

## 2023-12-29 PROCEDURE — 85027 COMPLETE CBC AUTOMATED: CPT

## 2023-12-29 PROCEDURE — 71046 X-RAY EXAM CHEST 2 VIEWS: CPT

## 2023-12-29 PROCEDURE — 36415 COLL VENOUS BLD VENIPUNCTURE: CPT

## 2023-12-29 PROCEDURE — 80048 BASIC METABOLIC PNL TOTAL CA: CPT

## 2023-12-29 RX ORDER — SODIUM CHLORIDE, SODIUM LACTATE, POTASSIUM CHLORIDE, CALCIUM CHLORIDE 600; 310; 30; 20 MG/100ML; MG/100ML; MG/100ML; MG/100ML
INJECTION, SOLUTION INTRAVENOUS CONTINUOUS
OUTPATIENT
Start: 2024-01-09

## 2023-12-29 RX ORDER — SODIUM CHLORIDE, SODIUM LACTATE, POTASSIUM CHLORIDE, CALCIUM CHLORIDE 600; 310; 30; 20 MG/100ML; MG/100ML; MG/100ML; MG/100ML
INJECTION, SOLUTION INTRAVENOUS CONTINUOUS
Status: CANCELLED | OUTPATIENT
Start: 2024-01-09 | Stop reason: CLARIF

## 2023-12-29 ASSESSMENT — PAIN SCALES - GENERAL: PAINLEVEL_OUTOF10: 5

## 2023-12-29 ASSESSMENT — PAIN DESCRIPTION - ORIENTATION: ORIENTATION: RIGHT

## 2023-12-29 ASSESSMENT — PAIN DESCRIPTION - LOCATION: LOCATION: FINGER (COMMENT WHICH ONE)

## 2023-12-29 ASSESSMENT — PAIN DESCRIPTION - DESCRIPTORS: DESCRIPTORS: ACHING

## 2023-12-30 LAB
EKG ATRIAL RATE: 56 BPM
EKG DIAGNOSIS: NORMAL
EKG P AXIS: 63 DEGREES
EKG P-R INTERVAL: 180 MS
EKG Q-T INTERVAL: 380 MS
EKG QRS DURATION: 108 MS
EKG QTC CALCULATION (BAZETT): 366 MS
EKG R AXIS: -84 DEGREES
EKG T AXIS: 268 DEGREES
EKG VENTRICULAR RATE: 56 BPM

## 2024-01-09 ENCOUNTER — HOSPITAL ENCOUNTER (OUTPATIENT)
Facility: HOSPITAL | Age: 64
Setting detail: OUTPATIENT SURGERY
Discharge: HOME | End: 2024-01-09
Attending: SURGERY | Admitting: SURGERY
Payer: COMMERCIAL

## 2024-01-09 ENCOUNTER — ANESTHESIA EVENT (OUTPATIENT)
Facility: HOSPITAL | Age: 64
End: 2024-01-09
Payer: COMMERCIAL

## 2024-01-09 ENCOUNTER — ANESTHESIA (OUTPATIENT)
Facility: HOSPITAL | Age: 64
End: 2024-01-09
Payer: COMMERCIAL

## 2024-01-09 VITALS
WEIGHT: 201.5 LBS | HEIGHT: 73 IN | HEART RATE: 70 BPM | DIASTOLIC BLOOD PRESSURE: 75 MMHG | SYSTOLIC BLOOD PRESSURE: 124 MMHG | OXYGEN SATURATION: 94 % | TEMPERATURE: 97.6 F | BODY MASS INDEX: 26.71 KG/M2 | RESPIRATION RATE: 16 BRPM

## 2024-01-09 DIAGNOSIS — G40.219 SEIZURE DISORDER, TEMPORAL LOBE, INTRACTABLE (HCC): Primary | ICD-10-CM

## 2024-01-09 PROCEDURE — 7100000000 HC PACU RECOVERY - FIRST 15 MIN: Performed by: SURGERY

## 2024-01-09 PROCEDURE — 7100000001 HC PACU RECOVERY - ADDTL 15 MIN: Performed by: SURGERY

## 2024-01-09 PROCEDURE — 6360000002 HC RX W HCPCS: Performed by: SURGERY

## 2024-01-09 PROCEDURE — 7100000010 HC PHASE II RECOVERY - FIRST 15 MIN: Performed by: SURGERY

## 2024-01-09 PROCEDURE — 3700000001 HC ADD 15 MINUTES (ANESTHESIA): Performed by: SURGERY

## 2024-01-09 PROCEDURE — 6360000002 HC RX W HCPCS: Performed by: NURSE ANESTHETIST, CERTIFIED REGISTERED

## 2024-01-09 PROCEDURE — 61885 INSRT/REDO NEUROSTIM 1 ARRAY: CPT | Performed by: SURGERY

## 2024-01-09 PROCEDURE — 2580000003 HC RX 258: Performed by: SURGERY

## 2024-01-09 PROCEDURE — 95976 ALYS SMPL CN NPGT PRGRMG: CPT | Performed by: SURGERY

## 2024-01-09 PROCEDURE — 3700000000 HC ANESTHESIA ATTENDED CARE: Performed by: SURGERY

## 2024-01-09 PROCEDURE — 7100000011 HC PHASE II RECOVERY - ADDTL 15 MIN: Performed by: SURGERY

## 2024-01-09 PROCEDURE — C1767 GENERATOR, NEURO NON-RECHARG: HCPCS | Performed by: SURGERY

## 2024-01-09 PROCEDURE — 2500000003 HC RX 250 WO HCPCS: Performed by: NURSE ANESTHETIST, CERTIFIED REGISTERED

## 2024-01-09 PROCEDURE — 2580000003 HC RX 258: Performed by: ANESTHESIOLOGY

## 2024-01-09 PROCEDURE — 6370000000 HC RX 637 (ALT 250 FOR IP): Performed by: ANESTHESIOLOGY

## 2024-01-09 PROCEDURE — 2709999900 HC NON-CHARGEABLE SUPPLY: Performed by: SURGERY

## 2024-01-09 PROCEDURE — 3600000012 HC SURGERY LEVEL 2 ADDTL 15MIN: Performed by: SURGERY

## 2024-01-09 PROCEDURE — 3600000002 HC SURGERY LEVEL 2 BASE: Performed by: SURGERY

## 2024-01-09 DEVICE — GENERATOR NEUROSTIMULATOR FOR VNS THER SENTIVA: Type: IMPLANTABLE DEVICE | Site: CHEST  WALL | Status: FUNCTIONAL

## 2024-01-09 RX ORDER — BUPIVACAINE HYDROCHLORIDE 5 MG/ML
INJECTION, SOLUTION PERINEURAL PRN
Status: DISCONTINUED | OUTPATIENT
Start: 2024-01-09 | End: 2024-01-09 | Stop reason: ALTCHOICE

## 2024-01-09 RX ORDER — ROCURONIUM BROMIDE 10 MG/ML
INJECTION, SOLUTION INTRAVENOUS PRN
Status: DISCONTINUED | OUTPATIENT
Start: 2024-01-09 | End: 2024-01-09 | Stop reason: SDUPTHER

## 2024-01-09 RX ORDER — FENTANYL CITRATE 50 UG/ML
100 INJECTION, SOLUTION INTRAMUSCULAR; INTRAVENOUS
Status: DISCONTINUED | OUTPATIENT
Start: 2024-01-09 | End: 2024-01-09 | Stop reason: HOSPADM

## 2024-01-09 RX ORDER — ESMOLOL HYDROCHLORIDE 10 MG/ML
INJECTION INTRAVENOUS PRN
Status: DISCONTINUED | OUTPATIENT
Start: 2024-01-09 | End: 2024-01-09 | Stop reason: SDUPTHER

## 2024-01-09 RX ORDER — ACETAMINOPHEN 325 MG/1
650 TABLET ORAL
Status: DISCONTINUED | OUTPATIENT
Start: 2024-01-09 | End: 2024-01-09 | Stop reason: HOSPADM

## 2024-01-09 RX ORDER — ONDANSETRON 2 MG/ML
4 INJECTION INTRAMUSCULAR; INTRAVENOUS ONCE
Status: DISCONTINUED | OUTPATIENT
Start: 2024-01-09 | End: 2024-01-09

## 2024-01-09 RX ORDER — FENTANYL CITRATE 50 UG/ML
25 INJECTION, SOLUTION INTRAMUSCULAR; INTRAVENOUS EVERY 5 MIN PRN
Status: DISCONTINUED | OUTPATIENT
Start: 2024-01-09 | End: 2024-01-09 | Stop reason: HOSPADM

## 2024-01-09 RX ORDER — SODIUM CHLORIDE, SODIUM LACTATE, POTASSIUM CHLORIDE, CALCIUM CHLORIDE 600; 310; 30; 20 MG/100ML; MG/100ML; MG/100ML; MG/100ML
INJECTION, SOLUTION INTRAVENOUS CONTINUOUS
Status: DISCONTINUED | OUTPATIENT
Start: 2024-01-09 | End: 2024-01-09 | Stop reason: HOSPADM

## 2024-01-09 RX ORDER — PROPOFOL 10 MG/ML
INJECTION, EMULSION INTRAVENOUS PRN
Status: DISCONTINUED | OUTPATIENT
Start: 2024-01-09 | End: 2024-01-09 | Stop reason: SDUPTHER

## 2024-01-09 RX ORDER — MIDAZOLAM HYDROCHLORIDE 2 MG/2ML
2 INJECTION, SOLUTION INTRAMUSCULAR; INTRAVENOUS
Status: DISCONTINUED | OUTPATIENT
Start: 2024-01-09 | End: 2024-01-09 | Stop reason: HOSPADM

## 2024-01-09 RX ORDER — SODIUM CHLORIDE 9 MG/ML
INJECTION, SOLUTION INTRAVENOUS PRN
Status: DISCONTINUED | OUTPATIENT
Start: 2024-01-09 | End: 2024-01-09 | Stop reason: HOSPADM

## 2024-01-09 RX ORDER — FENTANYL CITRATE 50 UG/ML
INJECTION, SOLUTION INTRAMUSCULAR; INTRAVENOUS PRN
Status: DISCONTINUED | OUTPATIENT
Start: 2024-01-09 | End: 2024-01-09 | Stop reason: SDUPTHER

## 2024-01-09 RX ORDER — SODIUM CHLORIDE 0.9 % (FLUSH) 0.9 %
5-40 SYRINGE (ML) INJECTION EVERY 12 HOURS SCHEDULED
Status: DISCONTINUED | OUTPATIENT
Start: 2024-01-09 | End: 2024-01-09 | Stop reason: HOSPADM

## 2024-01-09 RX ORDER — ONDANSETRON 2 MG/ML
INJECTION INTRAMUSCULAR; INTRAVENOUS PRN
Status: DISCONTINUED | OUTPATIENT
Start: 2024-01-09 | End: 2024-01-09 | Stop reason: SDUPTHER

## 2024-01-09 RX ORDER — PHENYLEPHRINE HCL IN 0.9% NACL 0.4MG/10ML
SYRINGE (ML) INTRAVENOUS PRN
Status: DISCONTINUED | OUTPATIENT
Start: 2024-01-09 | End: 2024-01-09 | Stop reason: SDUPTHER

## 2024-01-09 RX ORDER — PROCHLORPERAZINE EDISYLATE 5 MG/ML
5 INJECTION INTRAMUSCULAR; INTRAVENOUS
Status: DISCONTINUED | OUTPATIENT
Start: 2024-01-09 | End: 2024-01-09 | Stop reason: HOSPADM

## 2024-01-09 RX ORDER — MIDAZOLAM HYDROCHLORIDE 1 MG/ML
INJECTION INTRAMUSCULAR; INTRAVENOUS PRN
Status: DISCONTINUED | OUTPATIENT
Start: 2024-01-09 | End: 2024-01-09 | Stop reason: SDUPTHER

## 2024-01-09 RX ORDER — SODIUM CHLORIDE 0.9 % (FLUSH) 0.9 %
5-40 SYRINGE (ML) INJECTION PRN
Status: DISCONTINUED | OUTPATIENT
Start: 2024-01-09 | End: 2024-01-09 | Stop reason: HOSPADM

## 2024-01-09 RX ORDER — DEXAMETHASONE SODIUM PHOSPHATE 4 MG/ML
4 INJECTION, SOLUTION INTRA-ARTICULAR; INTRALESIONAL; INTRAMUSCULAR; INTRAVENOUS; SOFT TISSUE
Status: DISCONTINUED | OUTPATIENT
Start: 2024-01-09 | End: 2024-01-09 | Stop reason: HOSPADM

## 2024-01-09 RX ORDER — HYDROMORPHONE HYDROCHLORIDE 1 MG/ML
0.25 INJECTION, SOLUTION INTRAMUSCULAR; INTRAVENOUS; SUBCUTANEOUS EVERY 5 MIN PRN
Status: DISCONTINUED | OUTPATIENT
Start: 2024-01-09 | End: 2024-01-09 | Stop reason: HOSPADM

## 2024-01-09 RX ORDER — POLYETHYLENE GLYCOL 3350 17 G/17G
17 POWDER, FOR SOLUTION ORAL DAILY
Qty: 116 G | Refills: 0 | Status: SHIPPED | OUTPATIENT
Start: 2024-01-09 | End: 2024-01-16

## 2024-01-09 RX ORDER — HYDROCODONE BITARTRATE AND ACETAMINOPHEN 5; 325 MG/1; MG/1
1 TABLET ORAL
Status: DISCONTINUED | OUTPATIENT
Start: 2024-01-09 | End: 2024-01-09 | Stop reason: HOSPADM

## 2024-01-09 RX ORDER — SUCCINYLCHOLINE/SOD CL,ISO/PF 200MG/10ML
SYRINGE (ML) INTRAVENOUS PRN
Status: DISCONTINUED | OUTPATIENT
Start: 2024-01-09 | End: 2024-01-09 | Stop reason: SDUPTHER

## 2024-01-09 RX ORDER — EPHEDRINE SULFATE/0.9% NACL/PF 50 MG/5 ML
SYRINGE (ML) INTRAVENOUS PRN
Status: DISCONTINUED | OUTPATIENT
Start: 2024-01-09 | End: 2024-01-09 | Stop reason: SDUPTHER

## 2024-01-09 RX ORDER — IBUPROFEN 600 MG/1
600 TABLET ORAL
Qty: 20 TABLET | Refills: 0 | Status: SHIPPED | OUTPATIENT
Start: 2024-01-09

## 2024-01-09 RX ORDER — NEOSTIGMINE METHYLSULFATE 1 MG/ML
INJECTION, SOLUTION INTRAVENOUS PRN
Status: DISCONTINUED | OUTPATIENT
Start: 2024-01-09 | End: 2024-01-09 | Stop reason: SDUPTHER

## 2024-01-09 RX ORDER — ACETAMINOPHEN 500 MG
1000 TABLET ORAL ONCE
Status: COMPLETED | OUTPATIENT
Start: 2024-01-09 | End: 2024-01-09

## 2024-01-09 RX ORDER — LIDOCAINE HYDROCHLORIDE 20 MG/ML
INJECTION, SOLUTION EPIDURAL; INFILTRATION; INTRACAUDAL; PERINEURAL PRN
Status: DISCONTINUED | OUTPATIENT
Start: 2024-01-09 | End: 2024-01-09 | Stop reason: SDUPTHER

## 2024-01-09 RX ORDER — DEXAMETHASONE SODIUM PHOSPHATE 4 MG/ML
INJECTION, SOLUTION INTRA-ARTICULAR; INTRALESIONAL; INTRAMUSCULAR; INTRAVENOUS; SOFT TISSUE PRN
Status: DISCONTINUED | OUTPATIENT
Start: 2024-01-09 | End: 2024-01-09 | Stop reason: SDUPTHER

## 2024-01-09 RX ORDER — HYDROCODONE BITARTRATE AND ACETAMINOPHEN 5; 325 MG/1; MG/1
1 TABLET ORAL EVERY 6 HOURS PRN
Qty: 20 TABLET | Refills: 0 | Status: SHIPPED | OUTPATIENT
Start: 2024-01-09 | End: 2024-01-14

## 2024-01-09 RX ORDER — GLYCOPYRROLATE 0.2 MG/ML
INJECTION INTRAMUSCULAR; INTRAVENOUS PRN
Status: DISCONTINUED | OUTPATIENT
Start: 2024-01-09 | End: 2024-01-09 | Stop reason: SDUPTHER

## 2024-01-09 RX ORDER — ACETAMINOPHEN 500 MG
1000 TABLET ORAL ONCE
Status: DISCONTINUED | OUTPATIENT
Start: 2024-01-09 | End: 2024-01-09 | Stop reason: HOSPADM

## 2024-01-09 RX ORDER — HYDRALAZINE HYDROCHLORIDE 20 MG/ML
10 INJECTION INTRAMUSCULAR; INTRAVENOUS
Status: DISCONTINUED | OUTPATIENT
Start: 2024-01-09 | End: 2024-01-09 | Stop reason: HOSPADM

## 2024-01-09 RX ORDER — OXYCODONE HYDROCHLORIDE 5 MG/1
5 TABLET ORAL
Status: DISCONTINUED | OUTPATIENT
Start: 2024-01-09 | End: 2024-01-09 | Stop reason: HOSPADM

## 2024-01-09 RX ADMIN — ACETAMINOPHEN 1000 MG: 500 TABLET ORAL at 10:20

## 2024-01-09 RX ADMIN — SODIUM CHLORIDE, POTASSIUM CHLORIDE, SODIUM LACTATE AND CALCIUM CHLORIDE 25 ML: 600; 310; 30; 20 INJECTION, SOLUTION INTRAVENOUS at 10:22

## 2024-01-09 RX ADMIN — Medication 20 MG: at 11:29

## 2024-01-09 RX ADMIN — ROCURONIUM BROMIDE 20 MG: 10 INJECTION INTRAVENOUS at 11:20

## 2024-01-09 RX ADMIN — MIDAZOLAM HYDROCHLORIDE 2 MG: 1 INJECTION, SOLUTION INTRAMUSCULAR; INTRAVENOUS at 11:01

## 2024-01-09 RX ADMIN — LIDOCAINE HYDROCHLORIDE 100 MG: 20 INJECTION, SOLUTION EPIDURAL; INFILTRATION; INTRACAUDAL; PERINEURAL at 11:07

## 2024-01-09 RX ADMIN — FENTANYL CITRATE 50 MCG: 50 INJECTION, SOLUTION INTRAMUSCULAR; INTRAVENOUS at 11:53

## 2024-01-09 RX ADMIN — Medication 10 MG: at 11:39

## 2024-01-09 RX ADMIN — Medication 20 MG: at 11:18

## 2024-01-09 RX ADMIN — Medication 3 MG: at 11:44

## 2024-01-09 RX ADMIN — ESMOLOL HYDROCHLORIDE 20 MG: 10 INJECTION, SOLUTION INTRAVENOUS at 11:44

## 2024-01-09 RX ADMIN — Medication 140 MG: at 11:08

## 2024-01-09 RX ADMIN — ONDANSETRON HYDROCHLORIDE 4 MG: 2 INJECTION, SOLUTION INTRAMUSCULAR; INTRAVENOUS at 11:28

## 2024-01-09 RX ADMIN — WATER 2000 MG: 1 INJECTION INTRAMUSCULAR; INTRAVENOUS; SUBCUTANEOUS at 11:11

## 2024-01-09 RX ADMIN — Medication 80 MCG: at 11:20

## 2024-01-09 RX ADMIN — FENTANYL CITRATE 50 MCG: 50 INJECTION, SOLUTION INTRAMUSCULAR; INTRAVENOUS at 11:07

## 2024-01-09 RX ADMIN — GLYCOPYRROLATE 0.4 MG: 0.2 INJECTION INTRAMUSCULAR; INTRAVENOUS at 11:44

## 2024-01-09 RX ADMIN — ROCURONIUM BROMIDE 5 MG: 10 INJECTION INTRAVENOUS at 11:07

## 2024-01-09 RX ADMIN — PROPOFOL 120 MG: 10 INJECTION, EMULSION INTRAVENOUS at 11:07

## 2024-01-09 RX ADMIN — DEXAMETHASONE SODIUM PHOSPHATE 8 MG: 4 INJECTION, SOLUTION INTRAMUSCULAR; INTRAVENOUS at 11:15

## 2024-01-09 ASSESSMENT — PAIN - FUNCTIONAL ASSESSMENT
PAIN_FUNCTIONAL_ASSESSMENT: NONE - DENIES PAIN
PAIN_FUNCTIONAL_ASSESSMENT: 0-10

## 2024-01-09 NOTE — INTERVAL H&P NOTE
Update History & Physical    The Patient's History and Physical below was reviewed with the patient and I examined the patient today.  There was no change.  The surgical site was confirmed by the patient and me.    Plan:  The risk, benefits, expected outcome, and alternative to the recommended procedure have been discussed with the patient.  Patient understands and wants to proceed with the procedure.

## 2024-01-09 NOTE — OP NOTE
Operative Report    Patient: Pollo Mendoza MRN: [id number]  SSN: [id number]    YOB: 1960  Age: 63 y.o.  Sex: male       Date of Surgery: 1/9/2024     Preoperative Diagnosis:  Dysfunction of Vagus Nerve Stimulator Generator    Postoperative Diagnosis: Same    Procedure: Replacement of Vagus Nerve Stimulator Generator [id number], Electronic Testing and Programming [id number]    Surgeon:  Eric Cruz MD    Assistant:  staff    Anesthesia: General     Estimated Blood Loss:  min    Implants:   Implant Name Type Inv. Item Serial No.  Lot No. LRB No. Used Action   (HISTORICAL) GENERATOR VAGUS NRV STIM 14ML -- SGL PIN ASPIRESR - [id number]   [id number] [employer]_CR  Left 1 Explanted   GENERATOR NEUROSTIMULATOR FOR VNS THER SENTIVA - [id number]  GENERATOR NEUROSTIMULATOR FOR VNS THER SENTIVA [id number] [employer]-WD NA Left 1 Implanted               Specimens: none        Drains: None                Complications: None immediate    Operative Summary:   The patient was taken to the operating room and placed on the operating table in the supine position with the neck extended and rotated to the right.  The patient's left neck and chest were prepared and sterilely draped.  An [place] drape was utilized.  An incision was made along the left anterior axillary line about 5 cm in length through the previous surgical scar.  The incision was carried down to the capsule surrounding the VNS generator.  The capsule was opened and the generator brought out after division of the prolene fixation suture.  The lead entered the capsule at 3 o'clock.  The set screw was loosened and the old generator removed.       A new vagus nerve stimulator generator ([employer]) was then attached to the lead with the set screw and the device was interrogated electronically with the generator outside of the patient. A lead test was performed; readings were normal.  The generator was then placed in the pocket.  Testing was repeated and heart

## 2024-01-09 NOTE — ANESTHESIA PRE PROCEDURE
0.9 % injection 5-40 mL  5-40 mL IntraVENous 2 times per day Damien Sharpe MD       • sodium chloride flush 0.9 % injection 5-40 mL  5-40 mL IntraVENous PRN Damien Sharpe MD       • 0.9 % sodium chloride infusion   IntraVENous PRN Damien Sharpe MD       • midazolam PF (VERSED) injection 2 mg  2 mg IntraVENous Once PRN Damien Sharpe MD       • lactated ringers IV soln infusion   IntraVENous Continuous Dario Jade MD       • ceFAZolin (ANCEF) 2,000 mg in sterile water 20 mL IV syringe  2,000 mg IntraVENous Once Eric Cruz MD           Allergies:    Allergies   Allergen Reactions   • Carbamazepine Rash       Problem List:    Patient Active Problem List   Diagnosis Code   • MCI (mild cognitive impairment) G31.84   • Status post VNS (vagus nerve stimulator) placement Z96.89   • Partial epilepsy with impairment of consciousness, intractable (HCC) G40.219   • Seizure disorder, temporal lobe, intractable (HCC) G40.219       Past Medical History:        Diagnosis Date   • Burning with urination    • Hyperlipidemia    • Hypertension    • Seizures (HCC)     last seizure - several years ago   • Sleep apnea     Pt has machine but does not use       Past Surgical History:        Procedure Laterality Date   • OTHER SURGICAL HISTORY      vagus nerve stimulator       Social History:    Social History     Tobacco Use   • Smoking status: Former     Current packs/day: 0.00     Average packs/day: 1.5 packs/day for 20.0 years (30.0 ttl pk-yrs)     Types: Cigarettes     Start date: 1981     Quit date: 2001     Years since quittin.9     Passive exposure: Past   • Smokeless tobacco: Never   Substance Use Topics   • Alcohol use: No                                Counseling given: Not Answered      Vital Signs (Current): There were no vitals filed for this visit.                                           BP Readings from Last 3 Encounters:   23 (!) 138/93   23 131/72   23

## 2024-01-09 NOTE — ANESTHESIA POSTPROCEDURE EVALUATION
Department of Anesthesiology  Postprocedure Note    Patient: Pollo Mendoza  MRN: [id number]  YOB: 1960  Date of evaluation: 1/9/2024    Procedure Summary       Date: 01/09/24 Room / Location: [facility] / [facility]    Anesthesia Start: 1101 Anesthesia Stop: 1204    Procedure: VAGAL NERVE STIMULATOR GENERATOR CHANGE (Chest) Diagnosis:       Seizure disorder, temporal lobe, intractable (HCC)      (Seizure disorder, temporal lobe, intractable (HCC) [G40.219])    Providers: Eric Cruz MD Responsible Provider: Damien Sharpe MD    Anesthesia Type: general ASA Status: 2            Anesthesia Type: No value filed.    Glory Phase I: Glory Score: 10    Glory Phase II:      Anesthesia Post Evaluation    Patient location during evaluation: PACU  Patient participation: complete - patient participated  Level of consciousness: sleepy but conscious and responsive to verbal stimuli  Pain score: 2  Airway patency: patent  Nausea & Vomiting: no vomiting and no nausea  Cardiovascular status: blood pressure returned to baseline and hemodynamically stable  Respiratory status: acceptable  Hydration status: stable  Multimodal analgesia pain management approach  Pain management: adequate    No notable events documented.

## 2024-01-09 NOTE — PERIOP NOTE
TRANSFER - OUT REPORT:    Verbal report given to Manoj NEVES  on Pollo Mendoza  being transferred to phase 2(unit) for routine post-op       Report consisted of patient’s Situation, Background, Assessment and   Recommendations(SBAR).     Information from the following report(s) Surgery Report, Intake/Output, MAR, and Cardiac Rhythm SR   was reviewed with the receiving nurse.    Opportunity for questions and clarification was provided.      Patient transported with:   Registered Nurse    Stephanie     This SmartLink retrieves the last documented value for LDA assessment data, retrieved by either LDA type or by LDA group ID.     This SmartLink should be used in a SmartText or SmartPhrase. If one is not available, please contact your .

## 2024-01-09 NOTE — PERIOP NOTE
Pt   recent  covid  test  no s/s/   mepilex sacrum border preventatively applied    skin intact.   Voided   x1  in bathroom.    Tylenol    po   given   see   mar.   Pt   alert  and   oriented  x4   reports  no seizure  activity in years.   Speech  clear.

## 2024-01-09 NOTE — DISCHARGE INSTRUCTIONS
Discharge Instructions:  Dr. Cruz, office [phone].    See me in the office in 1 week for incision check.  Follow up with your neurologist within 2-3 weeks.      Activity:  Walk regularly starting immediately.  No lifting more than 10 -15 pounds for 4 weeks.      Work:  You may return to work in 1 week to light activity. No lifting more than 10 pounds (=\"light duty\") for 4 weeks.    If your employer can not accommodate \"light duty,\" your employer will need to provide any necessary paperwork to our office.   This document with serve as the initial \"note\" to your employer.    Diet:  If you are hungry today eat small amounts and bland foods.  You may resume normal diet tomorrow.    Wound Care:  Dermabond glue dressing will fall off on its own.  If it is still there in 2 weeks, peel it off.      If you experience a lot of drainage, develop redness around the wound, or a fever over 101 F occurs please call the office.      You may shower.  No baths or swimming for 2 weeks.    Medications:  Resume home medications as indicated on the Medical Reconciliation form.  Do not use blood thinners (such as Aspirin, Coumadin, or Plavix) until 3 days after surgery.      Pain medications:  Non steroidal antiinflammatories (ibuprofen -- Advil or Motrin) seem to work best for post surgical pain.  Try these first.  A narcotic prescription will also be given for breakthrough pain.    PUT ICE ON YOUR INCISION 20 MINUTES EVERY HOUR WHILE AWAKE FOR THE NEXT 3 DAYS AT LEAST.  PLACE A THIN CLOTH BETWEEN YOUR SKIN AND THE ICE BAG.    Colace or Miralax should be used twice daily to prevent constipation while on narcotics.  If you are still having trouble having a BM after 1-2 days, try milk of magnesia.  If this does not work within 24 hours, try a bottle of magnesium citrate.     Narcotics and anesthesia sometimes cause nausea and vomiting.  If persistent please call the office. DISCHARGE SUMMARY from Nurse    PATIENT

## 2024-01-16 ENCOUNTER — OFFICE VISIT (OUTPATIENT)
Age: 64
End: 2024-01-16

## 2024-01-16 VITALS
BODY MASS INDEX: 27.17 KG/M2 | DIASTOLIC BLOOD PRESSURE: 89 MMHG | HEIGHT: 73 IN | RESPIRATION RATE: 20 BRPM | SYSTOLIC BLOOD PRESSURE: 139 MMHG | TEMPERATURE: 97.5 F | WEIGHT: 205 LBS | HEART RATE: 83 BPM | OXYGEN SATURATION: 94 %

## 2024-01-16 DIAGNOSIS — Z48.89 POSTOPERATIVE VISIT: Primary | ICD-10-CM

## 2024-01-16 PROCEDURE — 99024 POSTOP FOLLOW-UP VISIT: CPT | Performed by: SURGERY

## 2024-01-16 ASSESSMENT — PATIENT HEALTH QUESTIONNAIRE - PHQ9
SUM OF ALL RESPONSES TO PHQ QUESTIONS 1-9: 0
SUM OF ALL RESPONSES TO PHQ QUESTIONS 1-9: 0
2. FEELING DOWN, DEPRESSED OR HOPELESS: 0
SUM OF ALL RESPONSES TO PHQ QUESTIONS 1-9: 0
1. LITTLE INTEREST OR PLEASURE IN DOING THINGS: 0
SUM OF ALL RESPONSES TO PHQ9 QUESTIONS 1 & 2: 0
SUM OF ALL RESPONSES TO PHQ QUESTIONS 1-9: 0

## 2024-01-16 NOTE — PROGRESS NOTES
Identified pt with two pt identifiers(name and ). Reviewed record in preparation for visit and have obtained necessary documentation. All patient medications has been reviewed.    Chief Complaint   Patient presents with    Post-Op Check     S/P  week VNS generator change 24       Health Maintenance Due   Topic    COVID-19 Vaccine (1)    Depression Screen     HIV screen     Hepatitis C screen     DTaP/Tdap/Td vaccine (1 - Tdap)    Diabetes screen     Lipids     Colorectal Cancer Screen     Shingles vaccine (1 of 2)    Respiratory Syncytial Virus (RSV) Pregnant or age 60 yrs+ (1 - 1-dose 60+ series)    Flu vaccine (1)       [unfilled]    4.Have you been to the ER, urgent care clinic since your last visit?  Hospitalized since your last visit?no    5. Have you seen or consulted any other health care providers outside of the [facility] since your last visit?  Include any pap smears or colon screening. no      Patient is accompanied by self I have received verbal consent from Pollo Mendoza to discuss any/all medical information while they are present in the room.

## 2024-01-16 NOTE — PROGRESS NOTES
Status post replacement of VNS generator on 1/9/2024.  He says he feels great.  He went back to work very quickly.  No pain.  Has not had any seizures since surgery.  Has not seen Dr. Andrews since surgery.    On exam, the incision is healing nicely and there is no concern for infection.  The glue dressing is still intact.    Assessment and plan: Appropriate recovery.  Told him that he could begin peeling the glue dressing off if it still there in a week.  Reminded of activity restrictions.  Reminded him to follow-up with Dr. Andrews.  Told to call with any concerns.

## 2024-01-17 ENCOUNTER — TELEPHONE (OUTPATIENT)
Age: 64
End: 2024-01-17

## 2024-01-17 NOTE — TELEPHONE ENCOUNTER
Patient requesting a call to discuss getting a appt for Dr. Andrews to reset his VNS settings.

## 2024-01-19 NOTE — TELEPHONE ENCOUNTER
Pt had generator replaced and does not feel it firing off.  Has not had any sz but wanted to make sure it was working as he usually feels it when it fires.  Scheduled appt for 1/23/24 at [facility] (address given to pt) at 0930

## 2024-01-23 ENCOUNTER — PROCEDURE VISIT (OUTPATIENT)
Age: 64
End: 2024-01-23
Payer: COMMERCIAL

## 2024-01-23 DIAGNOSIS — G40.219 LOCALIZATION-RELATED (FOCAL) (PARTIAL) SYMPTOMATIC EPILEPSY AND EPILEPTIC SYNDROMES WITH COMPLEX PARTIAL SEIZURES, INTRACTABLE, WITHOUT STATUS EPILEPTICUS (HCC): Primary | ICD-10-CM

## 2024-01-23 PROCEDURE — 95970 ALYS NPGT W/O PRGRMG: CPT | Performed by: PSYCHIATRY & NEUROLOGY

## 2024-01-23 PROCEDURE — 99214 OFFICE O/P EST MOD 30 MIN: CPT | Performed by: PSYCHIATRY & NEUROLOGY

## 2024-01-23 NOTE — PROGRESS NOTES
[facility] at [facility] at [address] [place] [address] [address] [facility] [address] [place] [address] [phone]              Chief Complaint   Patient presents with    Seizures     VNS generator check, had replaced 24 and does not feel it firing like it did prior to replacement.           Current Outpatient Medications   Medication Sig Dispense Refill    Brivaracetam (BRIVIACT) 100 MG TABS tablet TAKE ONE TABLET BY MOUTH 2 TIMES A DAY Max Daily Amount: 300 mg. (Patient taking differently: Take 1 tablet by mouth 2 times daily. TAKE ONE TABLET BY MOUTH 2 TIMES A DAY Max Daily Amount: 300 mg.) 60 tablet 5    Brivaracetam (BRIVIACT) 50 MG tablet TAKE ONE TABLET BY MOUTH 2 TIMES A DAY Max Daily Amount: 300 mg. (Patient taking differently: Take 1 tablet by mouth 2 times daily. TAKE ONE TABLET BY MOUTH 2 TIMES A DAY Max Daily Amount: 300 mg.) 60 tablet 5    lacosamide (VIMPAT) 200 MG tablet Take one tab every morning, one tab at lunch, and two tabs at night.  Max daily dose 800 mg 120 tablet 5    ibuprofen (ADVIL;MOTRIN) 600 MG tablet Take 1 tablet by mouth 3 times daily (with meals) (Patient not taking: Reported on 2024) 20 tablet 0    ibuprofen (ADVIL;MOTRIN) 800 MG tablet Take 1 tablet by mouth every 8 hours as needed (Patient not taking: Reported on 2024)       No current facility-administered medications for this visit.      Allergies   Allergen Reactions    Carbamazepine Rash     Social History     Tobacco Use    Smoking status: Former     Current packs/day: 0.00     Average packs/day: 1.5 packs/day for 20.0 years (30.0 ttl pk-yrs)     Types: Cigarettes     Start date: 1981     Quit date: 2001     Years since quittin.0     Passive exposure: Past    Smokeless tobacco: Never   Vaping Use    Vaping Use: Never used   Substance Use Topics    Alcohol use: No

## 2024-01-28 NOTE — TELEPHONE ENCOUNTER
Patient came in this morning for his appt and had to reschedule due to [address] being out of the office pt coming from home.  pt woke up feeling dizzy thi am.  upon EMS arrival, pt was found to be bradycardic.  Hx:  HTN,

## 2024-04-11 ENCOUNTER — TELEPHONE (OUTPATIENT)
Age: 64
End: 2024-04-11

## 2024-04-15 NOTE — TELEPHONE ENCOUNTER
Re: Lacosamide 200mg    Opened up the key that was submitted by nurse and PA is denied. Per letter : The health plan has a limit on the amount of this drug, 60 tablets per 30 days. We did not see why you need more than this amount. We cannot approve the amount requested, but you may fill up to the limit.    Scanned to chart and update to nurse.

## 2024-04-15 NOTE — TELEPHONE ENCOUNTER
Patient is calling to follow up on his PA for medication Briviact or a generic brand. Pollo stated he has 4 days worth of medication left for his seizure medication.

## 2024-04-16 NOTE — TELEPHONE ENCOUNTER
Janeen Andrews MD  You24 minutes ago (12:01 PM)       He has been on this dose of medicine for a number of years and it controls his seizures  His insurance had no issue paying for previouslyIssue and appeal  Please also have him perhaps to get the additional tablets with a good Rx which is about $35 for 60 tablets

## 2024-05-02 DIAGNOSIS — G40.219 LOCALIZATION-RELATED (FOCAL) (PARTIAL) SYMPTOMATIC EPILEPSY AND EPILEPTIC SYNDROMES WITH COMPLEX PARTIAL SEIZURES, INTRACTABLE, WITHOUT STATUS EPILEPTICUS (HCC): ICD-10-CM

## 2024-05-02 NOTE — TELEPHONE ENCOUNTER
Patient is needing a new script/refill on medications Briviact now that PA is approved.     Please advise.

## 2024-05-03 NOTE — TELEPHONE ENCOUNTER
[employer] called to say PA are not going through    Pls call  Tyler pharmacist @ [facility] and advise  [phone]

## 2024-05-13 ENCOUNTER — OFFICE VISIT (OUTPATIENT)
Age: 64
End: 2024-05-13
Payer: COMMERCIAL

## 2024-05-13 VITALS
DIASTOLIC BLOOD PRESSURE: 87 MMHG | WEIGHT: 202 LBS | BODY MASS INDEX: 25.93 KG/M2 | HEART RATE: 87 BPM | OXYGEN SATURATION: 98 % | RESPIRATION RATE: 14 BRPM | HEIGHT: 74 IN | SYSTOLIC BLOOD PRESSURE: 132 MMHG

## 2024-05-13 DIAGNOSIS — G40.219 LOCALIZATION-RELATED (FOCAL) (PARTIAL) SYMPTOMATIC EPILEPSY AND EPILEPTIC SYNDROMES WITH COMPLEX PARTIAL SEIZURES, INTRACTABLE, WITHOUT STATUS EPILEPTICUS (HCC): Primary | ICD-10-CM

## 2024-05-13 DIAGNOSIS — Z96.89 S/P PLACEMENT OF VNS (VAGUS NERVE STIMULATION) DEVICE: ICD-10-CM

## 2024-05-13 PROCEDURE — 99214 OFFICE O/P EST MOD 30 MIN: CPT | Performed by: PSYCHIATRY & NEUROLOGY

## 2024-05-13 PROCEDURE — 95970 ALYS NPGT W/O PRGRMG: CPT | Performed by: PSYCHIATRY & NEUROLOGY

## 2024-05-13 ASSESSMENT — PATIENT HEALTH QUESTIONNAIRE - PHQ9
SUM OF ALL RESPONSES TO PHQ QUESTIONS 1-9: 0
2. FEELING DOWN, DEPRESSED OR HOPELESS: NOT AT ALL
SUM OF ALL RESPONSES TO PHQ QUESTIONS 1-9: 0
SUM OF ALL RESPONSES TO PHQ9 QUESTIONS 1 & 2: 0
1. LITTLE INTEREST OR PLEASURE IN DOING THINGS: NOT AT ALL

## 2024-05-13 NOTE — PROGRESS NOTES
[facility]rology Clinics and Neurodiagnostic Center at [facility] Neurology Clinics at [address] [place] [address] [address] [facility] [address] [place] [address] [phone]              Chief Complaint   Patient presents with    Seizures     VNS replaced 24 // denies seizure activity //   Briviact 100 mg tablets 1 tablet twice daily  Briviact 50 mg tablet 1 tablet twice daily  Vimpat 200 mg tablet 1 every morning, 1 q. lunch and 2 nightly       Current Outpatient Medications   Medication Sig Dispense Refill    Brivaracetam (BRIVIACT) 100 MG TABS tablet TAKE ONE TABLET BY MOUTH 2 TIMES A DAY Max Daily Amount: 300 mg. 60 tablet 5    Brivaracetam (BRIVIACT) 50 MG tablet TAKE ONE TABLET BY MOUTH 2 TIMES A DAY Max Daily Amount: 300 mg. 60 tablet 5    lacosamide (VIMPAT) 200 MG tablet Take one tab every morning, one tab at lunch, and two tabs at night.  Max daily dose 800 mg 120 tablet 5    ibuprofen (ADVIL;MOTRIN) 600 MG tablet Take 1 tablet by mouth 3 times daily (with meals) (Patient not taking: Reported on 2024) 20 tablet 0    ibuprofen (ADVIL;MOTRIN) 800 MG tablet Take 1 tablet by mouth every 8 hours as needed (Patient not taking: Reported on 2024)       No current facility-administered medications for this visit.      Allergies   Allergen Reactions    Carbamazepine Rash     Social History     Tobacco Use    Smoking status: Former     Current packs/day: 0.00     Average packs/day: 1.5 packs/day for 20.0 years (30.0 ttl pk-yrs)     Types: Cigarettes     Start date: 1981     Quit date: 2001     Years since quittin.3     Passive exposure: Past    Smokeless tobacco: Never   Vaping Use    Vaping Use: Never used   Substance Use Topics    Alcohol use: No    Drug use: No   63-year-old gentleman with today for epilepsy.  It has been many years since his last seizure.  He tolerates medication without difficulty.  Vagus nerve

## 2024-05-14 ENCOUNTER — TELEPHONE (OUTPATIENT)
Age: 64
End: 2024-05-14

## 2024-05-14 NOTE — TELEPHONE ENCOUNTER
Re: Briviact 50    Had to created case in [facility] for clinicals, [facility] key# [id number], awaiting update.         Re: Briviact 100    Had to created case in [facility] for clinicals, [facility] key# [id number], awaiting update.

## 2024-05-14 NOTE — TELEPHONE ENCOUNTER
Is calling to inform that Briviact 100mg and 50mg is needing a PA. Also provided the following medications that are covered by insurance.     - CARBAMAZEPINE ER  - GABAPENTIN  - LAMICTAL  - KEPPRA (Generic)  - TRILEPTAL (Generic)  - TOPAMAX (Generic)  - ZONISAMIDE

## 2024-05-20 NOTE — TELEPHONE ENCOUNTER
Re: briviact 50mg    Approval [facility]. [facility]# [id number], effective 05/18/24-05/18/25, scanned to chart.    Re: briviact 100    Approval [facility]# [id number] effective 05/18/24-05/18/25, scanned to chart.  Update to nurse.

## 2024-05-24 DIAGNOSIS — G40.219 LOCALIZATION-RELATED (FOCAL) (PARTIAL) SYMPTOMATIC EPILEPSY AND EPILEPTIC SYNDROMES WITH COMPLEX PARTIAL SEIZURES, INTRACTABLE, WITHOUT STATUS EPILEPTICUS (HCC): ICD-10-CM

## 2024-05-24 RX ORDER — LACOSAMIDE 200 MG/1
TABLET ORAL
Qty: 120 TABLET | Refills: 0 | Status: SHIPPED | OUTPATIENT
Start: 2024-05-24 | End: 2024-06-23

## 2024-06-24 DIAGNOSIS — G40.219 LOCALIZATION-RELATED (FOCAL) (PARTIAL) SYMPTOMATIC EPILEPSY AND EPILEPTIC SYNDROMES WITH COMPLEX PARTIAL SEIZURES, INTRACTABLE, WITHOUT STATUS EPILEPTICUS (HCC): ICD-10-CM

## 2024-06-25 RX ORDER — LACOSAMIDE 200 MG/1
TABLET ORAL
Qty: 120 TABLET | Refills: 1 | Status: SHIPPED | OUTPATIENT
Start: 2024-06-25 | End: 2024-07-24

## 2024-08-14 DIAGNOSIS — G40.219 LOCALIZATION-RELATED (FOCAL) (PARTIAL) SYMPTOMATIC EPILEPSY AND EPILEPTIC SYNDROMES WITH COMPLEX PARTIAL SEIZURES, INTRACTABLE, WITHOUT STATUS EPILEPTICUS (HCC): ICD-10-CM

## 2024-08-15 RX ORDER — LACOSAMIDE 200 MG/1
TABLET ORAL
Qty: 120 TABLET | Refills: 5 | Status: SHIPPED | OUTPATIENT
Start: 2024-08-15 | End: 2027-08-19

## 2024-10-19 DIAGNOSIS — G40.219 LOCALIZATION-RELATED (FOCAL) (PARTIAL) SYMPTOMATIC EPILEPSY AND EPILEPTIC SYNDROMES WITH COMPLEX PARTIAL SEIZURES, INTRACTABLE, WITHOUT STATUS EPILEPTICUS (HCC): ICD-10-CM

## 2024-10-21 RX ORDER — LACOSAMIDE 200 MG/1
TABLET ORAL
Qty: 120 TABLET | Refills: 0 | Status: SHIPPED | OUTPATIENT
Start: 2024-10-21 | End: 2024-11-21

## 2024-11-15 ENCOUNTER — HOSPITAL ENCOUNTER (EMERGENCY)
Facility: HOSPITAL | Age: 64
Discharge: HOME | End: 2024-11-15
Attending: EMERGENCY MEDICINE
Payer: COMMERCIAL

## 2024-11-15 ENCOUNTER — APPOINTMENT (OUTPATIENT)
Facility: HOSPITAL | Age: 64
End: 2024-11-15
Payer: COMMERCIAL

## 2024-11-15 VITALS
RESPIRATION RATE: 16 BRPM | DIASTOLIC BLOOD PRESSURE: 89 MMHG | TEMPERATURE: 97.3 F | WEIGHT: 212 LBS | OXYGEN SATURATION: 99 % | HEIGHT: 74 IN | HEART RATE: 55 BPM | BODY MASS INDEX: 27.21 KG/M2 | SYSTOLIC BLOOD PRESSURE: 159 MMHG

## 2024-11-15 DIAGNOSIS — N39.0 URINARY TRACT INFECTION WITHOUT HEMATURIA, SITE UNSPECIFIED: ICD-10-CM

## 2024-11-15 DIAGNOSIS — M54.50 ACUTE LEFT-SIDED LOW BACK PAIN WITHOUT SCIATICA: ICD-10-CM

## 2024-11-15 DIAGNOSIS — N13.4 HYDROURETER ON LEFT: Primary | ICD-10-CM

## 2024-11-15 LAB
ANION GAP SERPL CALC-SCNC: 8 MMOL/L (ref 2–12)
APPEARANCE UR: CLEAR
BACTERIA URNS QL MICRO: ABNORMAL /HPF
BASOPHILS # BLD: 0 K/UL (ref 0–1)
BASOPHILS NFR BLD: 1 % (ref 0–1)
BILIRUB UR QL: NEGATIVE
BUN SERPL-MCNC: 16 MG/DL (ref 8–23)
BUN/CREAT SERPL: 16 (ref 12–20)
CALCIUM SERPL-MCNC: 9.5 MG/DL (ref 8.8–10.2)
CHLORIDE SERPL-SCNC: 100 MMOL/L (ref 98–107)
CO2 SERPL-SCNC: 31 MMOL/L (ref 22–29)
COLOR UR: ABNORMAL
CREAT SERPL-MCNC: 1.02 MG/DL (ref 0.7–1.2)
DIFFERENTIAL METHOD BLD: ABNORMAL
EOSINOPHIL # BLD: 0.1 K/UL (ref 0–0.4)
EOSINOPHIL NFR BLD: 2 % (ref 0–7)
EPITH CASTS URNS QL MICRO: ABNORMAL /LPF
ERYTHROCYTE [DISTWIDTH] IN BLOOD BY AUTOMATED COUNT: 13.3 % (ref 11.5–14.5)
GLUCOSE SERPL-MCNC: 101 MG/DL (ref 65–100)
GLUCOSE UR STRIP.AUTO-MCNC: NEGATIVE MG/DL
HCT VFR BLD AUTO: 50.5 % (ref 36.6–50.3)
HGB BLD-MCNC: 17 G/DL (ref 12.1–17)
HGB UR QL STRIP: NEGATIVE
IMM GRANULOCYTES # BLD AUTO: 0 K/UL (ref 0–0.04)
IMM GRANULOCYTES NFR BLD AUTO: 0 % (ref 0–0.5)
KETONES UR QL STRIP.AUTO: NEGATIVE MG/DL
LEUKOCYTE ESTERASE UR QL STRIP.AUTO: ABNORMAL
LYMPHOCYTES # BLD: 1.1 K/UL (ref 0.8–3.5)
LYMPHOCYTES NFR BLD: 22 % (ref 12–49)
MCH RBC QN AUTO: 29.4 PG (ref 26–34)
MCHC RBC AUTO-ENTMCNC: 33.7 G/DL (ref 30–36.5)
MCV RBC AUTO: 87.4 FL (ref 80–99)
MONOCYTES # BLD: 0.6 K/UL (ref 0–1)
MONOCYTES NFR BLD: 11 % (ref 5–13)
MUCOUS THREADS URNS QL MICRO: ABNORMAL /LPF
NEUTS SEG # BLD: 3.1 K/UL (ref 1.8–8)
NEUTS SEG NFR BLD: 64 % (ref 32–75)
NITRITE UR QL STRIP.AUTO: NEGATIVE
NRBC # BLD: 0 K/UL (ref 0–0.01)
NRBC BLD-RTO: 0 PER 100 WBC
PH UR STRIP: 6 (ref 5–8)
PLATELET # BLD AUTO: 186 K/UL (ref 150–400)
PMV BLD AUTO: 8.8 FL (ref 8.9–12.9)
POTASSIUM SERPL-SCNC: 4.6 MMOL/L (ref 3.5–5.1)
PROT UR STRIP-MCNC: NEGATIVE MG/DL
RBC # BLD AUTO: 5.78 M/UL (ref 4.1–5.7)
RBC #/AREA URNS HPF: ABNORMAL /HPF (ref 0–5)
SODIUM SERPL-SCNC: 139 MMOL/L (ref 136–145)
SP GR UR REFRACTOMETRY: 1.01 (ref 1–1.03)
URINE CULTURE IF INDICATED: ABNORMAL
UROBILINOGEN UR QL STRIP.AUTO: 0.2 EU/DL (ref 0.2–1)
WBC # BLD AUTO: 4.9 K/UL (ref 4.1–11.1)
WBC URNS QL MICRO: ABNORMAL /HPF (ref 0–4)

## 2024-11-15 PROCEDURE — 36415 COLL VENOUS BLD VENIPUNCTURE: CPT

## 2024-11-15 PROCEDURE — 85025 COMPLETE CBC W/AUTO DIFF WBC: CPT

## 2024-11-15 PROCEDURE — 74176 CT ABD & PELVIS W/O CONTRAST: CPT

## 2024-11-15 PROCEDURE — 72070 X-RAY EXAM THORAC SPINE 2VWS: CPT

## 2024-11-15 PROCEDURE — 80048 BASIC METABOLIC PNL TOTAL CA: CPT

## 2024-11-15 PROCEDURE — 99284 EMERGENCY DEPT VISIT MOD MDM: CPT

## 2024-11-15 PROCEDURE — 72100 X-RAY EXAM L-S SPINE 2/3 VWS: CPT

## 2024-11-15 PROCEDURE — 6370000000 HC RX 637 (ALT 250 FOR IP): Performed by: EMERGENCY MEDICINE

## 2024-11-15 PROCEDURE — 81001 URINALYSIS AUTO W/SCOPE: CPT

## 2024-11-15 PROCEDURE — 87086 URINE CULTURE/COLONY COUNT: CPT

## 2024-11-15 RX ORDER — IBUPROFEN 400 MG/1
400 TABLET, FILM COATED ORAL
Status: COMPLETED | OUTPATIENT
Start: 2024-11-15 | End: 2024-11-15

## 2024-11-15 RX ORDER — CEFDINIR 300 MG/1
300 CAPSULE ORAL 2 TIMES DAILY
Qty: 20 CAPSULE | Refills: 0 | Status: SHIPPED | OUTPATIENT
Start: 2024-11-15 | End: 2024-11-25

## 2024-11-15 RX ADMIN — IBUPROFEN 400 MG: 400 TABLET, FILM COATED ORAL at 08:13

## 2024-11-15 ASSESSMENT — PAIN - FUNCTIONAL ASSESSMENT: PAIN_FUNCTIONAL_ASSESSMENT: NONE - DENIES PAIN

## 2024-11-15 ASSESSMENT — LIFESTYLE VARIABLES
HOW MANY STANDARD DRINKS CONTAINING ALCOHOL DO YOU HAVE ON A TYPICAL DAY: PATIENT DOES NOT DRINK
HOW OFTEN DO YOU HAVE A DRINK CONTAINING ALCOHOL: NEVER

## 2024-11-15 NOTE — ED PROVIDER NOTES
and/or Complexity of Data Reviewed  Labs: ordered.  Radiology: ordered.    Risk  Prescription drug management.            REASSESSMENT     ED Course as of 11/15/24 1648   Fri Nov 15, 2024   0936 Moderate to severe hydroureter.  Will check postvoid residual.  Check kidney function with BMP.  If those are normal, will consider discharge home with antibiotics.  Added on urine culture. [RG]   1029 Postvoid bladder scan 49 mL not significant urinary retention. [RG]      ED Course User Index  [RG] Bright Sanders MD     0835  Small leuk esterase on x-ray.  Will proceed with CT abdomen pelvis without contrast to assess for kidney stone or other etiology of the back pain.    1045  Renal function normal.  Patient to follow-up with his urologist at [facility].  Tylenol as needed for pain.  Encourage hydration.  Treat for possible UTI with Omnicef.  Urine culture added.      Patient's results have been reviewed with them.  Patient and/or family have verbally conveyed their understanding and agreement of the patient's signs, symptoms, diagnosis, treatment and prognosis and additionally agree to follow up as recommended or return to the Emergency Room should their condition change prior to follow-up.  Discharge instructions have also been provided to the patient with some educational information regarding their diagnosis as well a list of reasons why they would want to return to the ER prior to their follow-up appointment should their condition change.      CONSULTS:  None    PROCEDURES:  Unless otherwise noted below, none     Procedures      FINAL IMPRESSION      1. Hydroureter on left    2. Acute left-sided low back pain without sciatica    3. Urinary tract infection without hematuria, site unspecified          DISPOSITION/PLAN   DISPOSITION Decision To Discharge 11/15/2024 10:50:54 AM      PATIENT REFERRED TO:  [facility]  [address] [place]

## 2024-11-15 NOTE — ED TRIAGE NOTES
Pt arrives to ER POV c/o mid lower back pain that started yesterday after working inside of a vehicle. Pain does not radiate, worse with movement, no surgical hx on back.

## 2024-11-17 LAB
BACTERIA SPEC CULT: ABNORMAL
CC UR VC: ABNORMAL
SERVICE CMNT-IMP: ABNORMAL

## 2024-11-19 DIAGNOSIS — G40.219 LOCALIZATION-RELATED (FOCAL) (PARTIAL) SYMPTOMATIC EPILEPSY AND EPILEPTIC SYNDROMES WITH COMPLEX PARTIAL SEIZURES, INTRACTABLE, WITHOUT STATUS EPILEPTICUS (HCC): ICD-10-CM

## 2024-11-21 RX ORDER — LACOSAMIDE 200 MG/1
TABLET ORAL
Qty: 120 TABLET | Refills: 5 | Status: SHIPPED | OUTPATIENT
Start: 2024-11-21 | End: 2026-11-04

## 2025-03-05 ENCOUNTER — TELEPHONE (OUTPATIENT)
Age: 65
End: 2025-03-05

## 2025-03-05 NOTE — TELEPHONE ENCOUNTER
S/w pt, he states his insurance is changing through employer and after they confirmed with pt and his supervisor that they would cover levetiracetam, he is requesting this office \"have letter written stating Dr. Andrews would change Briviact to levetiracetam\" as pt states he felt better with the levetiracetam.      Discussed that no letter is needed as Dr. Andrews prescribing it and sending it to pharmacy is stating he would change the medication.  After a long discussion, it sounds like pt's previous insurance would not cover the levetiracetam because his dose was 6000 mg daily (4-750 mg tabs TID), which is well above the maximum daily dose.    Advised pt to upload his new insurance card once it changes, let us know.  If Dr. Andrews agrees to rx his previous dose, will send to pharmacy.  Prior auth process will go from there.  IF the insurance covers say a max daily amount of 8 tabs, he may have insurance cover that and then use good rx to cover the other four tabs.

## 2025-03-05 NOTE — TELEPHONE ENCOUNTER
Patient wants the Keppra prescription written again so hopefully it will be covered by his new insurance as of 4/1 [facility], and he stated it will be more affordable under his new plan.    I advised patient to upload his new cards when he received them via [employer].    Thanks.

## 2025-04-25 ENCOUNTER — TELEPHONE (OUTPATIENT)
Age: 65
End: 2025-04-25

## 2025-04-25 DIAGNOSIS — G40.219 LOCALIZATION-RELATED (FOCAL) (PARTIAL) SYMPTOMATIC EPILEPSY AND EPILEPTIC SYNDROMES WITH COMPLEX PARTIAL SEIZURES, INTRACTABLE, WITHOUT STATUS EPILEPTICUS (HCC): Primary | ICD-10-CM

## 2025-04-25 NOTE — TELEPHONE ENCOUNTER
HIPAA Verified (if caller is someone other than patient):   [] Yes  [] No      Reason for Call: (Include medication name & the questions for nurse/provider)  Patient wants to discuss changing medication from Briviact    to Keppra    Is this a medication reaction? (include symptoms & symptom onset):  [] Yes  [x] No        Level 1 Calls - attempted to reach practice?  [] Yes   [] No  [x] N/A    Reason Call Marked High Priority (if applicable):

## 2025-04-28 NOTE — TELEPHONE ENCOUNTER
See tele encounter 3/5/25    Still has not gotten the new card however, stated he received new card info over the phone.  Unfortunately, he was not able to find the paper he wrote it on.  Suggested that he call back once he finds it and give it to the person who takes the msg

## 2025-04-29 DIAGNOSIS — G40.219 LOCALIZATION-RELATED (FOCAL) (PARTIAL) SYMPTOMATIC EPILEPSY AND EPILEPTIC SYNDROMES WITH COMPLEX PARTIAL SEIZURES, INTRACTABLE, WITHOUT STATUS EPILEPTICUS (HCC): ICD-10-CM

## 2025-04-29 RX ORDER — LEVETIRACETAM 750 MG/1
1500 TABLET ORAL 2 TIMES DAILY
Qty: 360 TABLET | Refills: 3 | Status: SHIPPED | OUTPATIENT
Start: 2025-04-29

## 2025-04-29 NOTE — TELEPHONE ENCOUNTER
Added [facility] insurance information based on what the patient could give me without having a card in his hands. [facility] is mailing the card to the patient.    Please send the Keppra script over to [facility].

## 2025-04-29 NOTE — TELEPHONE ENCOUNTER
After s/w Dr. Andrews, called pt and let him know that Dr. Andrews is fine with pt going back to levetiracetam instead of Briviact but he would only prescribe 1500 mg BID, NOT 3000 mg TID.  Pt is fine with this.  Med was sent to [facility]

## 2025-05-09 ENCOUNTER — TELEPHONE (OUTPATIENT)
Age: 65
End: 2025-05-09

## 2025-05-14 ENCOUNTER — TELEPHONE (OUTPATIENT)
Age: 65
End: 2025-05-14

## 2025-05-14 DIAGNOSIS — G40.219 LOCALIZATION-RELATED (FOCAL) (PARTIAL) SYMPTOMATIC EPILEPSY AND EPILEPTIC SYNDROMES WITH COMPLEX PARTIAL SEIZURES, INTRACTABLE, WITHOUT STATUS EPILEPTICUS (HCC): Primary | ICD-10-CM

## 2025-05-14 RX ORDER — LACOSAMIDE 200 MG/1
200 TABLET ORAL
COMMUNITY
End: 2025-05-14 | Stop reason: SDUPTHER

## 2025-05-14 RX ORDER — LACOSAMIDE 200 MG/1
TABLET ORAL
Qty: 270 TABLET | Refills: 1 | Status: SHIPPED | OUTPATIENT
Start: 2025-05-14 | End: 2025-05-16 | Stop reason: SDUPTHER

## 2025-05-14 NOTE — TELEPHONE ENCOUNTER
Patient needs medication refilled.  Not understanding why is was DC.  He has a day 1/2 left.  Thanks.      lacosamide (VIMPAT) 200 MG tablet [[id number]]  DISCONTINUED    Order Details  Dose, Route, Frequency: As Directed   Dispense Quantity: 120 tablet Refills: 5          Sig: TAKE ONE TABLET BY MOUTH 2 TIMES A DAY AND 2 TABLETS AT BEDTIME         Start Date: 11/21/24 End Date: 05/13/25   Discontinued by: Elizabeth Hickman RN on 5/13/2025 09:45   Reason: Alternate therapy         Written Date: 11/21/24 Expiration Date: 11/21/25       Associated Diagnoses: Localization-related (focal) (partial) symptomatic epilepsy and epileptic syndromes with complex partial seizures, intractable, without status epilepticus (HCC) [G40.219]   Original Order: lacosamide (VIMPAT) 200 MG tablet [[id number]]   Providers    Authorizing Provider: Janeen Andrews MD NPI: [id number]   Ordering User: Janeen Andrews MD          Pharmacy    [facility] [address] - P [phone] - F [phone] [address] [facility] [address]  Phone: [phone]  Fax: [phone]

## 2025-05-16 RX ORDER — LACOSAMIDE 200 MG/1
TABLET ORAL
Qty: 360 TABLET | Refills: 1 | Status: SHIPPED | OUTPATIENT
Start: 2025-05-16 | End: 2026-05-15

## 2025-05-16 NOTE — TELEPHONE ENCOUNTER
Corrected and sent.    This was what was in chart last:      DispRefillsStartEnd  lacosamide (VIMPAT) 200 MG tablet (Discontinued)----5/14/2025Sig - Route: Take 1 tablet by mouth. Take one tablet po every morning and two tablets po every evening.  Max daily dose 600 mg. - OralClass: Historical MedReason for Discontinue: Nikunj prior authorization was found for this prescription.Found prior authorization for another prescription for the same medication: Closed - Prior Authorization not required for patient/medication

## 2025-05-16 NOTE — TELEPHONE ENCOUNTER
Patient is calling in stating the amount he takes is incorrect on the script.    States: He takes 1 in AM, 1 at 2PM, and 2 and Bedtime for a total of 4 pills per day.    Script is written like this.  He does say thank you for getting it sent over.  If we can just correct the amount.  Thanks!         Take one tablet po every morning and two tablets po every evening.  Max daily dose 600 mg.

## 2025-05-28 RX ORDER — LACOSAMIDE 200 MG/1
TABLET ORAL
Qty: 120 TABLET | Refills: 0 | OUTPATIENT
Start: 2025-05-28

## 2025-06-12 DIAGNOSIS — G40.219 LOCALIZATION-RELATED (FOCAL) (PARTIAL) SYMPTOMATIC EPILEPSY AND EPILEPTIC SYNDROMES WITH COMPLEX PARTIAL SEIZURES, INTRACTABLE, WITHOUT STATUS EPILEPTICUS (HCC): ICD-10-CM

## 2025-06-16 ENCOUNTER — OFFICE VISIT (OUTPATIENT)
Age: 65
End: 2025-06-16
Payer: COMMERCIAL

## 2025-06-16 VITALS
OXYGEN SATURATION: 98 % | DIASTOLIC BLOOD PRESSURE: 89 MMHG | SYSTOLIC BLOOD PRESSURE: 141 MMHG | HEART RATE: 59 BPM | RESPIRATION RATE: 17 BRPM

## 2025-06-16 DIAGNOSIS — G40.219 LOCALIZATION-RELATED (FOCAL) (PARTIAL) SYMPTOMATIC EPILEPSY AND EPILEPTIC SYNDROMES WITH COMPLEX PARTIAL SEIZURES, INTRACTABLE, WITHOUT STATUS EPILEPTICUS (HCC): ICD-10-CM

## 2025-06-16 PROCEDURE — 95970 ALYS NPGT W/O PRGRMG: CPT | Performed by: PSYCHIATRY & NEUROLOGY

## 2025-06-16 PROCEDURE — 3017F COLORECTAL CA SCREEN DOC REV: CPT | Performed by: PSYCHIATRY & NEUROLOGY

## 2025-06-16 PROCEDURE — 99214 OFFICE O/P EST MOD 30 MIN: CPT | Performed by: PSYCHIATRY & NEUROLOGY

## 2025-06-16 PROCEDURE — 1036F TOBACCO NON-USER: CPT | Performed by: PSYCHIATRY & NEUROLOGY

## 2025-06-16 PROCEDURE — G8427 DOCREV CUR MEDS BY ELIG CLIN: HCPCS | Performed by: PSYCHIATRY & NEUROLOGY

## 2025-06-16 PROCEDURE — G8419 CALC BMI OUT NRM PARAM NOF/U: HCPCS | Performed by: PSYCHIATRY & NEUROLOGY

## 2025-06-16 RX ORDER — LACOSAMIDE 200 MG/1
TABLET ORAL
Qty: 120 TABLET | Refills: 0 | OUTPATIENT
Start: 2025-06-16

## 2025-06-16 RX ORDER — LEVETIRACETAM 750 MG/1
1500 TABLET ORAL 2 TIMES DAILY
Qty: 360 TABLET | Refills: 3 | Status: SHIPPED | OUTPATIENT
Start: 2025-06-16

## 2025-06-16 RX ORDER — LACOSAMIDE 200 MG/1
TABLET ORAL
Qty: 360 TABLET | Refills: 1 | Status: SHIPPED | OUTPATIENT
Start: 2025-06-16 | End: 2026-06-15

## 2025-06-16 NOTE — PROGRESS NOTES
[facility] at [facility] at [address] [place] [address] [id number] [facility] [address] [place] [address] [phone]              Chief Complaint   Patient presents with    Follow-up     Patient is here for his yearly follow up for seizures. Patient reports no seizures.     Current Outpatient Medications   Medication Sig Dispense Refill    lacosamide (VIMPAT) 200 MG tablet Take one tablet po every morning one tablet every afternoon, and two tablets po every evening.  Max daily dose 800 mg. 360 tablet 1    levETIRAcetam (KEPPRA) 750 MG tablet Take 2 tablets by mouth 2 times daily 360 tablet 3    ibuprofen (ADVIL;MOTRIN) 600 MG tablet Take 1 tablet by mouth 3 times daily (with meals) (Patient not taking: Reported on 2025) 20 tablet 0    ibuprofen (ADVIL;MOTRIN) 800 MG tablet Take 1 tablet by mouth every 8 hours as needed (Patient not taking: Reported on 2025)       No current facility-administered medications for this visit.      Allergies   Allergen Reactions    Carbamazepine Rash     Social History     Tobacco Use    Smoking status: Former     Current packs/day: 0.00     Average packs/day: 1.5 packs/day for 20.0 years (30.0 ttl pk-yrs)     Types: Cigarettes     Start date: 1981     Quit date: 2001     Years since quittin.4     Passive exposure: Past    Smokeless tobacco: Never   Vaping Use    Vaping status: Never Used   Substance Use Topics    Alcohol use: No    Drug use: No       History of Present Illness  64-year-old gentleman following up today for epilepsy.  It has been many years since he has had a seizure.  He has been maintained on Vimpat and Keppra.  He remains seizure-free.  He is tolerating medication without any difficulty    Current seizure medications  Vimpat 200 mg tablets--1 tablet in the morning and 2 tablets in the evening  Keppra 750 mg

## 2025-09-02 ENCOUNTER — TELEPHONE (OUTPATIENT)
Facility: CLINIC | Age: 65
End: 2025-09-02

## (undated) DEVICE — SUTURE PROL 5 0 L18IN NONABSORBABLE BLU RB 1 L17MM 1 2 CIR 8756H

## (undated) DEVICE — BLADE ES L4IN INSUL EDGE

## (undated) DEVICE — BAG VI-DRAPE ISOLATION 18IN X 18IN STRL

## (undated) DEVICE — HYPODERMIC SAFETY NEEDLE: Brand: MAGELLAN

## (undated) DEVICE — SUTURE MCRYL SZ 4-0 L27IN ABSRB UD L19MM PS-2 1/2 CIR PRIM Y426H

## (undated) DEVICE — SOLUTION SURG PREP 26 CC PURPREP

## (undated) DEVICE — GLOVE SURG SZ 8 CRM LTX FREE POLYISOPRENE POLYMER BEAD ANTI

## (undated) DEVICE — YANKAUER,BULB TIP,W/O VENT,RIGID,STERILE: Brand: MEDLINE

## (undated) DEVICE — MAJOR LAPAROTOMY-MRMC: Brand: MEDLINE INDUSTRIES, INC.

## (undated) DEVICE — SUTURE CHROMIC GUT SZ 3-0 L27IN ABSRB BRN L26MM SH 1/2 CIR G122H

## (undated) DEVICE — 3M™ IOBAN™ 2 ANTIMICROBIAL INCISE DRAPE 6650EZ: Brand: IOBAN™ 2

## (undated) DEVICE — LIQUIBAND RAPID ADHESIVE 36/CS 0.8ML: Brand: MEDLINE

## (undated) DEVICE — GLOVE SURG SZ 85 CRM LTX FREE POLYISOPRENE POLYMER BEAD ANTI

## (undated) DEVICE — LOOP VES W13MM THK09MM MINI RED SIL FLD REPELLENT